# Patient Record
Sex: MALE
[De-identification: names, ages, dates, MRNs, and addresses within clinical notes are randomized per-mention and may not be internally consistent; named-entity substitution may affect disease eponyms.]

---

## 2018-01-26 ENCOUNTER — HOSPITAL ENCOUNTER (INPATIENT)
Dept: HOSPITAL 90 - EDH | Age: 68
LOS: 5 days | Discharge: HOME | DRG: 281 | End: 2018-01-31
Attending: FAMILY MEDICINE | Admitting: FAMILY MEDICINE
Payer: MEDICARE

## 2018-01-26 VITALS — SYSTOLIC BLOOD PRESSURE: 150 MMHG | DIASTOLIC BLOOD PRESSURE: 85 MMHG

## 2018-01-26 VITALS — DIASTOLIC BLOOD PRESSURE: 57 MMHG | SYSTOLIC BLOOD PRESSURE: 98 MMHG

## 2018-01-26 VITALS — DIASTOLIC BLOOD PRESSURE: 77 MMHG | SYSTOLIC BLOOD PRESSURE: 131 MMHG

## 2018-01-26 VITALS — BODY MASS INDEX: 35.36 KG/M2 | WEIGHT: 220 LBS | HEIGHT: 66 IN

## 2018-01-26 DIAGNOSIS — I11.0: ICD-10-CM

## 2018-01-26 DIAGNOSIS — Z95.1: ICD-10-CM

## 2018-01-26 DIAGNOSIS — Z28.21: ICD-10-CM

## 2018-01-26 DIAGNOSIS — E78.5: ICD-10-CM

## 2018-01-26 DIAGNOSIS — Z79.82: ICD-10-CM

## 2018-01-26 DIAGNOSIS — I47.1: ICD-10-CM

## 2018-01-26 DIAGNOSIS — I21.4: Primary | ICD-10-CM

## 2018-01-26 DIAGNOSIS — I25.110: ICD-10-CM

## 2018-01-26 DIAGNOSIS — Z82.49: ICD-10-CM

## 2018-01-26 DIAGNOSIS — Z87.891: ICD-10-CM

## 2018-01-26 DIAGNOSIS — Z88.0: ICD-10-CM

## 2018-01-26 DIAGNOSIS — I50.22: ICD-10-CM

## 2018-01-26 LAB
ALBUMIN SERPL-MCNC: 4.5 G/DL (ref 3.5–5)
ALT SERPL-CCNC: 60 U/L (ref 12–78)
AST SERPL-CCNC: 56 U/L (ref 10–37)
BASOPHILS NFR BLD AUTO: 0.3 % (ref 0–5)
BILIRUB SERPL-MCNC: 0.7 MG/DL (ref 0.2–1)
BUN SERPL-MCNC: 15 MG/DL (ref 7–18)
CHLORIDE SERPL-SCNC: 101 MMOL/L (ref 101–111)
CK MB SERPL-MCNC: 3 NG/ML (ref 0.5–3.6)
CK MB SERPL-MCNC: 41 NG/ML (ref 0.5–3.6)
CK SERPL-CCNC: 154 U/L (ref 21–232)
CK SERPL-CCNC: 285 U/L (ref 21–232)
CO2 SERPL-SCNC: 24 MMOL/L (ref 21–32)
CREAT SERPL-MCNC: 1.3 MG/DL (ref 0.5–1.5)
EOSINOPHIL NFR BLD AUTO: 0.2 % (ref 0–8)
ERYTHROCYTE [DISTWIDTH] IN BLOOD BY AUTOMATED COUNT: 13.7 % (ref 11–15.5)
GFR SERPL CREATININE-BSD FRML MDRD: 59 ML/MIN (ref 60–?)
GLUCOSE SERPL-MCNC: 155 MG/DL (ref 70–105)
HBA1C MFR BLD: 5.7 % (ref 4–6)
HCT VFR BLD AUTO: 48.8 % (ref 42–54)
LYMPHOCYTES NFR SPEC AUTO: 17.7 % (ref 21–51)
MCH RBC QN AUTO: 31.6 PG (ref 27–33)
MCHC RBC AUTO-ENTMCNC: 34.3 G/DL (ref 32–36)
MCV RBC AUTO: 92.1 FL (ref 79–99)
MONOCYTES NFR BLD AUTO: 2 % (ref 3–13)
MYOGLOBIN SERPL-MCNC: 85 NG/ML (ref 10–92)
MYOGLOBIN SERPL-MCNC: 89 NG/ML (ref 10–92)
NEUTROPHILS NFR BLD AUTO: 79.8 % (ref 40–77)
NRBC BLD MANUAL-RTO: 0 % (ref 0–0.19)
PLATELET # BLD AUTO: 219 K/UL (ref 130–400)
POTASSIUM SERPL-SCNC: 4 MMOL/L (ref 3.5–5.1)
PROT SERPL-MCNC: 8.3 G/DL (ref 6–8.3)
RBC # BLD AUTO: 5.3 MIL/UL (ref 4.5–6.2)
SODIUM SERPL-SCNC: 138 MMOL/L (ref 136–145)
T4 SERPL-MCNC: 7.1 MCG/DL (ref 4.7–13.3)
TROPONIN I SERPL-MCNC: 3.07 NG/ML (ref 0–0.06)
TROPONIN I SERPL-MCNC: < 0.04 NG/ML (ref 0–0.06)
TSH SERPL DL<=0.05 MIU/L-ACNC: 3.05 UIU/ML (ref 0.36–3.74)
WBC # BLD AUTO: 8.2 K/UL (ref 4.8–10.8)

## 2018-01-26 PROCEDURE — 82948 REAGENT STRIP/BLOOD GLUCOSE: CPT

## 2018-01-26 PROCEDURE — 82550 ASSAY OF CK (CPK): CPT

## 2018-01-26 PROCEDURE — 93459 L HRT ART/GRFT ANGIO: CPT

## 2018-01-26 PROCEDURE — 83880 ASSAY OF NATRIURETIC PEPTIDE: CPT

## 2018-01-26 PROCEDURE — 78452 HT MUSCLE IMAGE SPECT MULT: CPT

## 2018-01-26 PROCEDURE — 85610 PROTHROMBIN TIME: CPT

## 2018-01-26 PROCEDURE — 99291 CRITICAL CARE FIRST HOUR: CPT

## 2018-01-26 PROCEDURE — 80048 BASIC METABOLIC PNL TOTAL CA: CPT

## 2018-01-26 PROCEDURE — 85025 COMPLETE CBC W/AUTO DIFF WBC: CPT

## 2018-01-26 PROCEDURE — 82553 CREATINE MB FRACTION: CPT

## 2018-01-26 PROCEDURE — 84484 ASSAY OF TROPONIN QUANT: CPT

## 2018-01-26 PROCEDURE — 93005 ELECTROCARDIOGRAM TRACING: CPT

## 2018-01-26 PROCEDURE — 93017 CV STRESS TEST TRACING ONLY: CPT

## 2018-01-26 PROCEDURE — 83874 ASSAY OF MYOGLOBIN: CPT

## 2018-01-26 PROCEDURE — 99153 MOD SED SAME PHYS/QHP EA: CPT

## 2018-01-26 PROCEDURE — 80053 COMPREHEN METABOLIC PANEL: CPT

## 2018-01-26 PROCEDURE — 84443 ASSAY THYROID STIM HORMONE: CPT

## 2018-01-26 PROCEDURE — 99152 MOD SED SAME PHYS/QHP 5/>YRS: CPT

## 2018-01-26 PROCEDURE — 80061 LIPID PANEL: CPT

## 2018-01-26 PROCEDURE — 71045 X-RAY EXAM CHEST 1 VIEW: CPT

## 2018-01-26 PROCEDURE — 93306 TTE W/DOPPLER COMPLETE: CPT

## 2018-01-26 PROCEDURE — 84436 ASSAY OF TOTAL THYROXINE: CPT

## 2018-01-26 PROCEDURE — 96374 THER/PROPH/DIAG INJ IV PUSH: CPT

## 2018-01-26 PROCEDURE — 36415 COLL VENOUS BLD VENIPUNCTURE: CPT

## 2018-01-26 PROCEDURE — 83036 HEMOGLOBIN GLYCOSYLATED A1C: CPT

## 2018-01-26 PROCEDURE — 85027 COMPLETE CBC AUTOMATED: CPT

## 2018-01-26 RX ADMIN — ACETAMINOPHEN PRN MG: 325 TABLET, FILM COATED ORAL at 22:13

## 2018-01-26 RX ADMIN — FAMOTIDINE SCH MG: 10 INJECTION INTRAVENOUS at 20:46

## 2018-01-26 RX ADMIN — SODIUM CHLORIDE SCH UNIT: 9 INJECTION, SOLUTION INTRAVENOUS at 16:30

## 2018-01-26 RX ADMIN — Medication SCH MG: at 20:46

## 2018-01-26 RX ADMIN — SODIUM CHLORIDE SCH UNIT: 9 INJECTION, SOLUTION INTRAVENOUS at 20:47

## 2018-01-27 VITALS — DIASTOLIC BLOOD PRESSURE: 51 MMHG | SYSTOLIC BLOOD PRESSURE: 111 MMHG

## 2018-01-27 VITALS — SYSTOLIC BLOOD PRESSURE: 126 MMHG | DIASTOLIC BLOOD PRESSURE: 73 MMHG

## 2018-01-27 VITALS — DIASTOLIC BLOOD PRESSURE: 54 MMHG | SYSTOLIC BLOOD PRESSURE: 107 MMHG

## 2018-01-27 VITALS — SYSTOLIC BLOOD PRESSURE: 113 MMHG | DIASTOLIC BLOOD PRESSURE: 66 MMHG

## 2018-01-27 VITALS — DIASTOLIC BLOOD PRESSURE: 76 MMHG | SYSTOLIC BLOOD PRESSURE: 135 MMHG

## 2018-01-27 VITALS — SYSTOLIC BLOOD PRESSURE: 128 MMHG | DIASTOLIC BLOOD PRESSURE: 70 MMHG

## 2018-01-27 VITALS — DIASTOLIC BLOOD PRESSURE: 64 MMHG | SYSTOLIC BLOOD PRESSURE: 119 MMHG

## 2018-01-27 VITALS — SYSTOLIC BLOOD PRESSURE: 109 MMHG | DIASTOLIC BLOOD PRESSURE: 63 MMHG

## 2018-01-27 VITALS — DIASTOLIC BLOOD PRESSURE: 65 MMHG | SYSTOLIC BLOOD PRESSURE: 114 MMHG

## 2018-01-27 VITALS — DIASTOLIC BLOOD PRESSURE: 50 MMHG | SYSTOLIC BLOOD PRESSURE: 90 MMHG

## 2018-01-27 VITALS — DIASTOLIC BLOOD PRESSURE: 76 MMHG | SYSTOLIC BLOOD PRESSURE: 148 MMHG

## 2018-01-27 VITALS — DIASTOLIC BLOOD PRESSURE: 60 MMHG | SYSTOLIC BLOOD PRESSURE: 128 MMHG

## 2018-01-27 VITALS — SYSTOLIC BLOOD PRESSURE: 111 MMHG | DIASTOLIC BLOOD PRESSURE: 49 MMHG

## 2018-01-27 VITALS — DIASTOLIC BLOOD PRESSURE: 66 MMHG | SYSTOLIC BLOOD PRESSURE: 126 MMHG

## 2018-01-27 VITALS — SYSTOLIC BLOOD PRESSURE: 129 MMHG | DIASTOLIC BLOOD PRESSURE: 81 MMHG

## 2018-01-27 VITALS — DIASTOLIC BLOOD PRESSURE: 59 MMHG | SYSTOLIC BLOOD PRESSURE: 106 MMHG

## 2018-01-27 VITALS — SYSTOLIC BLOOD PRESSURE: 114 MMHG | DIASTOLIC BLOOD PRESSURE: 64 MMHG

## 2018-01-27 VITALS — SYSTOLIC BLOOD PRESSURE: 128 MMHG | DIASTOLIC BLOOD PRESSURE: 69 MMHG

## 2018-01-27 VITALS — SYSTOLIC BLOOD PRESSURE: 128 MMHG | DIASTOLIC BLOOD PRESSURE: 61 MMHG

## 2018-01-27 VITALS — DIASTOLIC BLOOD PRESSURE: 66 MMHG | SYSTOLIC BLOOD PRESSURE: 118 MMHG

## 2018-01-27 VITALS — DIASTOLIC BLOOD PRESSURE: 67 MMHG | SYSTOLIC BLOOD PRESSURE: 131 MMHG

## 2018-01-27 VITALS — SYSTOLIC BLOOD PRESSURE: 127 MMHG | DIASTOLIC BLOOD PRESSURE: 72 MMHG

## 2018-01-27 LAB
CK MB SERPL-MCNC: 21.8 NG/ML (ref 0.5–3.6)
CK MB SERPL-MCNC: 35.7 NG/ML (ref 0.5–3.6)
CK SERPL-CCNC: 213 U/L (ref 21–232)
CK SERPL-CCNC: 251 U/L (ref 21–232)
MYOGLOBIN SERPL-MCNC: 86 NG/ML (ref 10–92)
MYOGLOBIN SERPL-MCNC: 89 NG/ML (ref 10–92)
TROPONIN I SERPL-MCNC: 2.54 NG/ML (ref 0–0.06)
TROPONIN I SERPL-MCNC: 2.97 NG/ML (ref 0–0.06)

## 2018-01-27 RX ADMIN — ASPIRIN SCH MG: 325 TABLET, FILM COATED ORAL at 08:04

## 2018-01-27 RX ADMIN — FAMOTIDINE SCH MG: 10 INJECTION INTRAVENOUS at 08:04

## 2018-01-27 RX ADMIN — ATORVASTATIN CALCIUM SCH MG: 10 TABLET, FILM COATED ORAL at 20:38

## 2018-01-27 RX ADMIN — ACETAMINOPHEN PRN MG: 325 TABLET, FILM COATED ORAL at 19:55

## 2018-01-27 RX ADMIN — Medication SCH MG: at 20:38

## 2018-01-27 RX ADMIN — SODIUM CHLORIDE SCH UNIT: 9 INJECTION, SOLUTION INTRAVENOUS at 16:30

## 2018-01-27 RX ADMIN — SODIUM CHLORIDE SCH UNIT: 9 INJECTION, SOLUTION INTRAVENOUS at 06:26

## 2018-01-27 RX ADMIN — SODIUM CHLORIDE SCH UNIT: 9 INJECTION, SOLUTION INTRAVENOUS at 11:30

## 2018-01-27 RX ADMIN — CLOPIDOGREL BISULFATE SCH MG: 75 TABLET, FILM COATED ORAL at 16:15

## 2018-01-27 RX ADMIN — ACETAMINOPHEN PRN MG: 325 TABLET, FILM COATED ORAL at 23:20

## 2018-01-27 RX ADMIN — FAMOTIDINE SCH MG: 10 INJECTION INTRAVENOUS at 20:37

## 2018-01-27 RX ADMIN — Medication SCH MG: at 08:04

## 2018-01-27 RX ADMIN — SODIUM CHLORIDE SCH UNIT: 9 INJECTION, SOLUTION INTRAVENOUS at 21:00

## 2018-01-27 RX ADMIN — ACETAMINOPHEN PRN MG: 325 TABLET, FILM COATED ORAL at 14:35

## 2018-01-28 VITALS — DIASTOLIC BLOOD PRESSURE: 78 MMHG | SYSTOLIC BLOOD PRESSURE: 136 MMHG

## 2018-01-28 VITALS — SYSTOLIC BLOOD PRESSURE: 109 MMHG | DIASTOLIC BLOOD PRESSURE: 54 MMHG

## 2018-01-28 VITALS — SYSTOLIC BLOOD PRESSURE: 104 MMHG | DIASTOLIC BLOOD PRESSURE: 58 MMHG

## 2018-01-28 VITALS — DIASTOLIC BLOOD PRESSURE: 77 MMHG | SYSTOLIC BLOOD PRESSURE: 133 MMHG

## 2018-01-28 VITALS — SYSTOLIC BLOOD PRESSURE: 102 MMHG | DIASTOLIC BLOOD PRESSURE: 63 MMHG

## 2018-01-28 VITALS — SYSTOLIC BLOOD PRESSURE: 113 MMHG | DIASTOLIC BLOOD PRESSURE: 69 MMHG

## 2018-01-28 VITALS — DIASTOLIC BLOOD PRESSURE: 37 MMHG | SYSTOLIC BLOOD PRESSURE: 82 MMHG

## 2018-01-28 VITALS — SYSTOLIC BLOOD PRESSURE: 132 MMHG | DIASTOLIC BLOOD PRESSURE: 58 MMHG

## 2018-01-28 VITALS — DIASTOLIC BLOOD PRESSURE: 75 MMHG | SYSTOLIC BLOOD PRESSURE: 136 MMHG

## 2018-01-28 LAB
BUN SERPL-MCNC: 18 MG/DL (ref 7–18)
CHLORIDE SERPL-SCNC: 105 MMOL/L (ref 101–111)
CHOLEST SERPL-MCNC: 161 MG/DL (ref ?–200)
CK MB SERPL-MCNC: 7.8 NG/ML (ref 0.5–3.6)
CK SERPL-CCNC: 133 U/L (ref 21–232)
CO2 SERPL-SCNC: 27 MMOL/L (ref 21–32)
CREAT SERPL-MCNC: 1.2 MG/DL (ref 0.5–1.5)
ERYTHROCYTE [DISTWIDTH] IN BLOOD BY AUTOMATED COUNT: 13.6 % (ref 11–15.5)
GFR SERPL CREATININE-BSD FRML MDRD: 64 ML/MIN (ref 60–?)
GLUCOSE SERPL-MCNC: 99 MG/DL (ref 70–105)
HCT VFR BLD AUTO: 39.3 % (ref 42–54)
HDLC SERPL-MCNC: 44 MG/DL (ref 29–71)
LDLC SERPL CALC-MCNC: 103 MG/DL (ref 0–99)
MCH RBC QN AUTO: 31.6 PG (ref 27–33)
MCHC RBC AUTO-ENTMCNC: 34.3 G/DL (ref 32–36)
MCV RBC AUTO: 92.3 FL (ref 79–99)
MYOGLOBIN SERPL-MCNC: 92 NG/ML (ref 10–92)
NRBC BLD MANUAL-RTO: 0 % (ref 0–0.19)
PLATELET # BLD AUTO: 187 K/UL (ref 130–400)
POTASSIUM SERPL-SCNC: 3.8 MMOL/L (ref 3.5–5.1)
RBC # BLD AUTO: 4.25 MIL/UL (ref 4.5–6.2)
SODIUM SERPL-SCNC: 140 MMOL/L (ref 136–145)
TRIGL SERPL-MCNC: 141 MG/DL (ref 30–200)
TROPONIN I SERPL-MCNC: 1.85 NG/ML (ref 0–0.06)
WBC # BLD AUTO: 6.8 K/UL (ref 4.8–10.8)

## 2018-01-28 RX ADMIN — CHLORTHALIDONE SCH EACH: 50 TABLET ORAL at 09:14

## 2018-01-28 RX ADMIN — CHLORTHALIDONE SCH EACH: 50 TABLET ORAL at 09:00

## 2018-01-28 RX ADMIN — ENOXAPARIN SODIUM SCH MG: 100 INJECTION SUBCUTANEOUS at 08:17

## 2018-01-28 RX ADMIN — Medication SCH MG: at 21:37

## 2018-01-28 RX ADMIN — Medication SCH MG: at 21:38

## 2018-01-28 RX ADMIN — CLOPIDOGREL BISULFATE SCH MG: 75 TABLET, FILM COATED ORAL at 08:17

## 2018-01-28 RX ADMIN — FAMOTIDINE SCH MG: 10 INJECTION INTRAVENOUS at 21:39

## 2018-01-28 RX ADMIN — ATORVASTATIN CALCIUM SCH MG: 10 TABLET, FILM COATED ORAL at 21:37

## 2018-01-28 RX ADMIN — ASPIRIN SCH MG: 325 TABLET, FILM COATED ORAL at 08:17

## 2018-01-28 RX ADMIN — SODIUM CHLORIDE SCH UNIT: 9 INJECTION, SOLUTION INTRAVENOUS at 06:29

## 2018-01-28 RX ADMIN — FAMOTIDINE SCH MG: 10 INJECTION INTRAVENOUS at 08:35

## 2018-01-28 RX ADMIN — ACETAMINOPHEN PRN MG: 325 TABLET, FILM COATED ORAL at 20:17

## 2018-01-28 RX ADMIN — ACETAMINOPHEN PRN MG: 325 TABLET, FILM COATED ORAL at 06:42

## 2018-01-28 RX ADMIN — Medication SCH MG: at 08:17

## 2018-01-28 RX ADMIN — Medication SCH MG: at 13:19

## 2018-01-28 RX ADMIN — LOSARTAN POTASSIUM SCH MG: 50 TABLET, FILM COATED ORAL at 08:17

## 2018-01-29 VITALS — DIASTOLIC BLOOD PRESSURE: 68 MMHG | SYSTOLIC BLOOD PRESSURE: 128 MMHG

## 2018-01-29 VITALS — SYSTOLIC BLOOD PRESSURE: 118 MMHG | DIASTOLIC BLOOD PRESSURE: 61 MMHG

## 2018-01-29 VITALS — SYSTOLIC BLOOD PRESSURE: 141 MMHG | DIASTOLIC BLOOD PRESSURE: 79 MMHG

## 2018-01-29 VITALS — SYSTOLIC BLOOD PRESSURE: 141 MMHG | DIASTOLIC BLOOD PRESSURE: 81 MMHG

## 2018-01-29 VITALS — DIASTOLIC BLOOD PRESSURE: 61 MMHG | SYSTOLIC BLOOD PRESSURE: 161 MMHG

## 2018-01-29 VITALS — DIASTOLIC BLOOD PRESSURE: 77 MMHG | SYSTOLIC BLOOD PRESSURE: 135 MMHG

## 2018-01-29 VITALS — SYSTOLIC BLOOD PRESSURE: 104 MMHG | DIASTOLIC BLOOD PRESSURE: 59 MMHG

## 2018-01-29 LAB
BUN SERPL-MCNC: 18 MG/DL (ref 7–18)
CHLORIDE SERPL-SCNC: 105 MMOL/L (ref 101–111)
CO2 SERPL-SCNC: 30 MMOL/L (ref 21–32)
CREAT SERPL-MCNC: 1.2 MG/DL (ref 0.5–1.5)
GFR SERPL CREATININE-BSD FRML MDRD: 64 ML/MIN (ref 60–?)
GLUCOSE SERPL-MCNC: 95 MG/DL (ref 70–105)
INR PPP: 0.99 (ref 0.85–1.15)
POTASSIUM SERPL-SCNC: 3.7 MMOL/L (ref 3.5–5.1)
PROTHROMBIN TIME: 10.4 SEC (ref 9.6–11.6)
SODIUM SERPL-SCNC: 140 MMOL/L (ref 136–145)

## 2018-01-29 RX ADMIN — Medication SCH MG: at 16:52

## 2018-01-29 RX ADMIN — Medication SCH MG: at 21:19

## 2018-01-29 RX ADMIN — ACETAMINOPHEN PRN MG: 325 TABLET, FILM COATED ORAL at 18:56

## 2018-01-29 RX ADMIN — Medication SCH MG: at 16:53

## 2018-01-29 RX ADMIN — LOSARTAN POTASSIUM SCH MG: 50 TABLET, FILM COATED ORAL at 16:53

## 2018-01-29 RX ADMIN — ASPIRIN SCH MG: 325 TABLET, FILM COATED ORAL at 16:53

## 2018-01-29 RX ADMIN — CHLORTHALIDONE SCH EACH: 50 TABLET ORAL at 16:54

## 2018-01-29 RX ADMIN — FAMOTIDINE SCH MG: 10 INJECTION INTRAVENOUS at 21:20

## 2018-01-29 RX ADMIN — Medication SCH MG: at 21:20

## 2018-01-29 RX ADMIN — ENOXAPARIN SODIUM SCH MG: 100 INJECTION SUBCUTANEOUS at 16:55

## 2018-01-29 RX ADMIN — ATORVASTATIN CALCIUM SCH MG: 10 TABLET, FILM COATED ORAL at 21:19

## 2018-01-29 RX ADMIN — CLOPIDOGREL BISULFATE SCH MG: 75 TABLET, FILM COATED ORAL at 16:53

## 2018-01-29 RX ADMIN — FAMOTIDINE SCH MG: 10 INJECTION INTRAVENOUS at 16:55

## 2018-01-30 VITALS — DIASTOLIC BLOOD PRESSURE: 53 MMHG | SYSTOLIC BLOOD PRESSURE: 118 MMHG

## 2018-01-30 VITALS — SYSTOLIC BLOOD PRESSURE: 104 MMHG | DIASTOLIC BLOOD PRESSURE: 56 MMHG

## 2018-01-30 VITALS — SYSTOLIC BLOOD PRESSURE: 149 MMHG | DIASTOLIC BLOOD PRESSURE: 75 MMHG

## 2018-01-30 VITALS — DIASTOLIC BLOOD PRESSURE: 74 MMHG | SYSTOLIC BLOOD PRESSURE: 126 MMHG

## 2018-01-30 VITALS — DIASTOLIC BLOOD PRESSURE: 70 MMHG | SYSTOLIC BLOOD PRESSURE: 128 MMHG

## 2018-01-30 VITALS — DIASTOLIC BLOOD PRESSURE: 79 MMHG | SYSTOLIC BLOOD PRESSURE: 130 MMHG

## 2018-01-30 VITALS — DIASTOLIC BLOOD PRESSURE: 83 MMHG | SYSTOLIC BLOOD PRESSURE: 148 MMHG

## 2018-01-30 VITALS — DIASTOLIC BLOOD PRESSURE: 57 MMHG | SYSTOLIC BLOOD PRESSURE: 88 MMHG

## 2018-01-30 VITALS — SYSTOLIC BLOOD PRESSURE: 142 MMHG | DIASTOLIC BLOOD PRESSURE: 80 MMHG

## 2018-01-30 VITALS — DIASTOLIC BLOOD PRESSURE: 90 MMHG | SYSTOLIC BLOOD PRESSURE: 137 MMHG

## 2018-01-30 VITALS — DIASTOLIC BLOOD PRESSURE: 75 MMHG | SYSTOLIC BLOOD PRESSURE: 134 MMHG

## 2018-01-30 VITALS — SYSTOLIC BLOOD PRESSURE: 127 MMHG | DIASTOLIC BLOOD PRESSURE: 70 MMHG

## 2018-01-30 VITALS — SYSTOLIC BLOOD PRESSURE: 134 MMHG | DIASTOLIC BLOOD PRESSURE: 63 MMHG

## 2018-01-30 VITALS — DIASTOLIC BLOOD PRESSURE: 56 MMHG | SYSTOLIC BLOOD PRESSURE: 91 MMHG

## 2018-01-30 LAB
BUN SERPL-MCNC: 22 MG/DL (ref 7–18)
CHLORIDE SERPL-SCNC: 106 MMOL/L (ref 101–111)
CO2 SERPL-SCNC: 29 MMOL/L (ref 21–32)
CREAT SERPL-MCNC: 1.2 MG/DL (ref 0.5–1.5)
ERYTHROCYTE [DISTWIDTH] IN BLOOD BY AUTOMATED COUNT: 13.4 % (ref 11–15.5)
GFR SERPL CREATININE-BSD FRML MDRD: 64 ML/MIN (ref 60–?)
GLUCOSE SERPL-MCNC: 102 MG/DL (ref 70–105)
HCT VFR BLD AUTO: 41.8 % (ref 42–54)
MCH RBC QN AUTO: 31.7 PG (ref 27–33)
MCHC RBC AUTO-ENTMCNC: 34.5 G/DL (ref 32–36)
MCV RBC AUTO: 92 FL (ref 79–99)
NRBC BLD MANUAL-RTO: 0 % (ref 0–0.19)
PLATELET # BLD AUTO: 200 K/UL (ref 130–400)
POTASSIUM SERPL-SCNC: 4 MMOL/L (ref 3.5–5.1)
RBC # BLD AUTO: 4.54 MIL/UL (ref 4.5–6.2)
SODIUM SERPL-SCNC: 140 MMOL/L (ref 136–145)
WBC # BLD AUTO: 5.7 K/UL (ref 4.8–10.8)

## 2018-01-30 PROCEDURE — B2111ZZ FLUOROSCOPY OF MULTIPLE CORONARY ARTERIES USING LOW OSMOLAR CONTRAST: ICD-10-PCS | Performed by: INTERNAL MEDICINE

## 2018-01-30 PROCEDURE — 4A023N7 MEASUREMENT OF CARDIAC SAMPLING AND PRESSURE, LEFT HEART, PERCUTANEOUS APPROACH: ICD-10-PCS | Performed by: INTERNAL MEDICINE

## 2018-01-30 PROCEDURE — B2131ZZ FLUOROSCOPY OF MULTIPLE CORONARY ARTERY BYPASS GRAFTS USING LOW OSMOLAR CONTRAST: ICD-10-PCS | Performed by: INTERNAL MEDICINE

## 2018-01-30 PROCEDURE — B2181ZZ FLUOROSCOPY OF LEFT INTERNAL MAMMARY BYPASS GRAFT USING LOW OSMOLAR CONTRAST: ICD-10-PCS | Performed by: INTERNAL MEDICINE

## 2018-01-30 PROCEDURE — B3101ZZ FLUOROSCOPY OF THORACIC AORTA USING LOW OSMOLAR CONTRAST: ICD-10-PCS | Performed by: INTERNAL MEDICINE

## 2018-01-30 PROCEDURE — B2151ZZ FLUOROSCOPY OF LEFT HEART USING LOW OSMOLAR CONTRAST: ICD-10-PCS | Performed by: INTERNAL MEDICINE

## 2018-01-30 RX ADMIN — CHLORTHALIDONE SCH EACH: 50 TABLET ORAL at 07:54

## 2018-01-30 RX ADMIN — Medication SCH MG: at 21:09

## 2018-01-30 RX ADMIN — Medication SCH MG: at 07:44

## 2018-01-30 RX ADMIN — FAMOTIDINE SCH MG: 10 INJECTION INTRAVENOUS at 07:45

## 2018-01-30 RX ADMIN — FAMOTIDINE SCH MG: 10 INJECTION INTRAVENOUS at 21:09

## 2018-01-30 RX ADMIN — ACETAMINOPHEN AND CODEINE PHOSPHATE PRN TAB: 300; 30 TABLET ORAL at 21:11

## 2018-01-30 RX ADMIN — ACETAMINOPHEN PRN MG: 325 TABLET, FILM COATED ORAL at 00:38

## 2018-01-30 RX ADMIN — CLOPIDOGREL BISULFATE SCH MG: 75 TABLET, FILM COATED ORAL at 17:07

## 2018-01-30 RX ADMIN — ASPIRIN SCH MG: 325 TABLET, FILM COATED ORAL at 07:45

## 2018-01-30 RX ADMIN — ACETAMINOPHEN AND CODEINE PHOSPHATE PRN TAB: 300; 30 TABLET ORAL at 17:06

## 2018-01-30 RX ADMIN — LOSARTAN POTASSIUM SCH MG: 50 TABLET, FILM COATED ORAL at 07:44

## 2018-01-30 RX ADMIN — Medication SCH MG: at 07:45

## 2018-01-30 RX ADMIN — ATORVASTATIN CALCIUM SCH MG: 10 TABLET, FILM COATED ORAL at 21:09

## 2018-01-31 VITALS — DIASTOLIC BLOOD PRESSURE: 57 MMHG | SYSTOLIC BLOOD PRESSURE: 113 MMHG

## 2018-01-31 VITALS — DIASTOLIC BLOOD PRESSURE: 75 MMHG | SYSTOLIC BLOOD PRESSURE: 120 MMHG

## 2018-01-31 VITALS — DIASTOLIC BLOOD PRESSURE: 69 MMHG | SYSTOLIC BLOOD PRESSURE: 121 MMHG

## 2018-01-31 LAB
BUN SERPL-MCNC: 20 MG/DL (ref 7–18)
CHLORIDE SERPL-SCNC: 107 MMOL/L (ref 101–111)
CO2 SERPL-SCNC: 28 MMOL/L (ref 21–32)
CREAT SERPL-MCNC: 1.1 MG/DL (ref 0.5–1.5)
ERYTHROCYTE [DISTWIDTH] IN BLOOD BY AUTOMATED COUNT: 13.5 % (ref 11–15.5)
GFR SERPL CREATININE-BSD FRML MDRD: 71 ML/MIN (ref 60–?)
GLUCOSE SERPL-MCNC: 100 MG/DL (ref 70–105)
HCT VFR BLD AUTO: 40.6 % (ref 42–54)
MCH RBC QN AUTO: 31.8 PG (ref 27–33)
MCHC RBC AUTO-ENTMCNC: 34.5 G/DL (ref 32–36)
MCV RBC AUTO: 92.3 FL (ref 79–99)
NRBC BLD MANUAL-RTO: 0.1 % (ref 0–0.19)
PLATELET # BLD AUTO: 198 K/UL (ref 130–400)
POTASSIUM SERPL-SCNC: 3.6 MMOL/L (ref 3.5–5.1)
RBC # BLD AUTO: 4.4 MIL/UL (ref 4.5–6.2)
SODIUM SERPL-SCNC: 141 MMOL/L (ref 136–145)
WBC # BLD AUTO: 4.8 K/UL (ref 4.8–10.8)

## 2018-01-31 RX ADMIN — ASPIRIN SCH MG: 325 TABLET, FILM COATED ORAL at 09:53

## 2018-01-31 RX ADMIN — CHLORTHALIDONE SCH EACH: 50 TABLET ORAL at 09:00

## 2018-01-31 RX ADMIN — ACETAMINOPHEN PRN MG: 325 TABLET, FILM COATED ORAL at 06:34

## 2018-01-31 RX ADMIN — LOSARTAN POTASSIUM SCH MG: 50 TABLET, FILM COATED ORAL at 09:54

## 2018-01-31 RX ADMIN — CLOPIDOGREL BISULFATE SCH MG: 75 TABLET, FILM COATED ORAL at 09:54

## 2018-01-31 RX ADMIN — Medication SCH MG: at 09:54

## 2018-01-31 RX ADMIN — FAMOTIDINE SCH MG: 10 INJECTION INTRAVENOUS at 09:53

## 2018-01-31 RX ADMIN — Medication SCH MG: at 09:55

## 2018-06-28 ENCOUNTER — HOSPITAL ENCOUNTER (EMERGENCY)
Dept: HOSPITAL 90 - EDH | Age: 68
Discharge: HOME | End: 2018-06-28
Payer: MEDICARE

## 2018-06-28 DIAGNOSIS — I25.810: ICD-10-CM

## 2018-06-28 DIAGNOSIS — Z88.8: ICD-10-CM

## 2018-06-28 DIAGNOSIS — Z88.0: ICD-10-CM

## 2018-06-28 DIAGNOSIS — M19.072: Primary | ICD-10-CM

## 2018-06-28 DIAGNOSIS — E78.5: ICD-10-CM

## 2018-06-28 DIAGNOSIS — I10: ICD-10-CM

## 2018-06-28 PROCEDURE — 96372 THER/PROPH/DIAG INJ SC/IM: CPT

## 2018-06-28 PROCEDURE — 99284 EMERGENCY DEPT VISIT MOD MDM: CPT

## 2018-06-28 PROCEDURE — 73600 X-RAY EXAM OF ANKLE: CPT

## 2018-12-28 ENCOUNTER — HOSPITAL ENCOUNTER (EMERGENCY)
Dept: HOSPITAL 90 - EDH | Age: 68
Discharge: HOME | End: 2018-12-28
Payer: MEDICARE

## 2018-12-28 DIAGNOSIS — Z87.891: ICD-10-CM

## 2018-12-28 DIAGNOSIS — Z88.0: ICD-10-CM

## 2018-12-28 DIAGNOSIS — I10: ICD-10-CM

## 2018-12-28 DIAGNOSIS — I25.810: ICD-10-CM

## 2018-12-28 DIAGNOSIS — Z88.8: ICD-10-CM

## 2018-12-28 DIAGNOSIS — E78.5: ICD-10-CM

## 2018-12-28 DIAGNOSIS — J10.1: Primary | ICD-10-CM

## 2018-12-28 DIAGNOSIS — Z98.890: ICD-10-CM

## 2018-12-28 PROCEDURE — 99283 EMERGENCY DEPT VISIT LOW MDM: CPT

## 2018-12-28 PROCEDURE — 94640 AIRWAY INHALATION TREATMENT: CPT

## 2018-12-28 PROCEDURE — 96372 THER/PROPH/DIAG INJ SC/IM: CPT

## 2018-12-28 PROCEDURE — 71046 X-RAY EXAM CHEST 2 VIEWS: CPT

## 2020-07-17 ENCOUNTER — HOSPITAL ENCOUNTER (INPATIENT)
Dept: HOSPITAL 90 - EDH | Age: 70
LOS: 18 days | DRG: 871 | End: 2020-08-04
Attending: INTERNAL MEDICINE | Admitting: INTERNAL MEDICINE
Payer: MEDICARE

## 2020-07-17 VITALS — HEIGHT: 66 IN | BODY MASS INDEX: 30.53 KG/M2 | WEIGHT: 190 LBS

## 2020-07-17 DIAGNOSIS — Z88.8: ICD-10-CM

## 2020-07-17 DIAGNOSIS — A41.89: Primary | ICD-10-CM

## 2020-07-17 DIAGNOSIS — U07.1: ICD-10-CM

## 2020-07-17 DIAGNOSIS — K72.00: ICD-10-CM

## 2020-07-17 DIAGNOSIS — I11.0: ICD-10-CM

## 2020-07-17 DIAGNOSIS — J12.89: ICD-10-CM

## 2020-07-17 DIAGNOSIS — I25.10: ICD-10-CM

## 2020-07-17 DIAGNOSIS — Z82.0: ICD-10-CM

## 2020-07-17 DIAGNOSIS — A41.50: ICD-10-CM

## 2020-07-17 DIAGNOSIS — Z95.1: ICD-10-CM

## 2020-07-17 DIAGNOSIS — Z66: ICD-10-CM

## 2020-07-17 DIAGNOSIS — Z82.5: ICD-10-CM

## 2020-07-17 DIAGNOSIS — Z83.3: ICD-10-CM

## 2020-07-17 DIAGNOSIS — E87.6: ICD-10-CM

## 2020-07-17 DIAGNOSIS — Z82.3: ICD-10-CM

## 2020-07-17 DIAGNOSIS — J96.01: ICD-10-CM

## 2020-07-17 DIAGNOSIS — Z82.49: ICD-10-CM

## 2020-07-17 DIAGNOSIS — E78.5: ICD-10-CM

## 2020-07-17 DIAGNOSIS — Z80.42: ICD-10-CM

## 2020-07-17 DIAGNOSIS — N17.9: ICD-10-CM

## 2020-07-17 DIAGNOSIS — D50.0: ICD-10-CM

## 2020-07-17 DIAGNOSIS — I50.43: ICD-10-CM

## 2020-07-17 DIAGNOSIS — R65.21: ICD-10-CM

## 2020-07-17 DIAGNOSIS — R73.03: ICD-10-CM

## 2020-07-17 DIAGNOSIS — I25.2: ICD-10-CM

## 2020-07-17 DIAGNOSIS — K92.0: ICD-10-CM

## 2020-07-17 DIAGNOSIS — Z88.0: ICD-10-CM

## 2020-07-17 DIAGNOSIS — B96.20: ICD-10-CM

## 2020-07-17 LAB
ALBUMIN SERPL-MCNC: 3.5 G/DL (ref 3.5–5)
ALT SERPL-CCNC: 22 U/L (ref 12–78)
APTT PPP: 28 SEC (ref 26.3–35.5)
AST SERPL-CCNC: 32 U/L (ref 10–37)
BASE EXCESS BLDA CALC-SCNC: -0.7 MMOL/L (ref -2–3)
BASOPHILS NFR BLD AUTO: 1.3 % (ref 0–5)
BILIRUB SERPL-MCNC: 0.5 MG/DL (ref 0.2–1)
BUN SERPL-MCNC: 16 MG/DL (ref 7–18)
CHLORIDE SERPL-SCNC: 99 MMOL/L (ref 101–111)
CK SERPL-CCNC: 205 U/L (ref 21–232)
CO2 SERPL-SCNC: 24 MMOL/L (ref 21–32)
CREAT SERPL-MCNC: 1.1 MG/DL (ref 0.5–1.5)
DEPRECATED SQUAMOUS URNS QL MICRO: (no result) /HPF (ref 0–2)
EOSINOPHIL NFR BLD AUTO: 0.6 % (ref 0–8)
ERYTHROCYTE [DISTWIDTH] IN BLOOD BY AUTOMATED COUNT: 13.3 % (ref 11–15.5)
GFR SERPL CREATININE-BSD FRML MDRD: 71 ML/MIN (ref 60–?)
GLUCOSE SERPL-MCNC: 110 MG/DL (ref 70–105)
HBA1C MFR BLD: 6.5 % (ref 4–6)
HCO3 BLDA-SCNC: 22.8 MMOL/L (ref 21–28)
HCT VFR BLD AUTO: 42.6 % (ref 42–54)
INR PPP: 0.89 (ref 0.85–1.15)
LYMPHOCYTES NFR SPEC AUTO: 13.8 % (ref 21–51)
MCH RBC QN AUTO: 30.8 PG (ref 27–33)
MCHC RBC AUTO-ENTMCNC: 33.8 G/DL (ref 32–36)
MCV RBC AUTO: 91.2 FL (ref 79–99)
MONOCYTES NFR BLD AUTO: 8.6 % (ref 3–13)
MYOGLOBIN SERPL-MCNC: 158 NG/ML (ref 10–92)
NEUTROPHILS NFR BLD AUTO: 75.4 % (ref 40–77)
NRBC BLD MANUAL-RTO: 0 % (ref 0–0.19)
PCO2 BLDA: 35 MMHG (ref 35–48)
PH UR STRIP: 5 [PH] (ref 5–8)
PLATELET # BLD AUTO: 155 K/UL (ref 130–400)
POTASSIUM SERPL-SCNC: 3.7 MMOL/L (ref 3.5–5.1)
PROT SERPL-MCNC: 7.8 G/DL (ref 6–8.3)
PROT UR QL STRIP: (no result) MG/DL
PROTHROMBIN TIME: 9.7 SEC (ref 9.6–11.6)
RBC # BLD AUTO: 4.67 MIL/UL (ref 4.5–6.2)
RBC #/AREA URNS HPF: (no result) /HPF (ref 0–1)
SAO2 % BLDA: 96.7 % (ref 95–99)
SODIUM SERPL-SCNC: 136 MMOL/L (ref 136–145)
SP GR UR STRIP: 1.02 (ref 1–1.03)
TROPONIN I SERPL-MCNC: < 0.04 NG/ML (ref 0–0.06)
UROBILINOGEN UR STRIP-MCNC: 0.2 MG/DL (ref 0.2–1)
WBC # BLD AUTO: 6.4 K/UL (ref 4.8–10.8)
WBC #/AREA URNS HPF: (no result) /HPF (ref 0–1)

## 2020-07-17 PROCEDURE — 86901 BLOOD TYPING SEROLOGIC RH(D): CPT

## 2020-07-17 PROCEDURE — 83880 ASSAY OF NATRIURETIC PEPTIDE: CPT

## 2020-07-17 PROCEDURE — 36430 TRANSFUSION BLD/BLD COMPNT: CPT

## 2020-07-17 PROCEDURE — 84484 ASSAY OF TROPONIN QUANT: CPT

## 2020-07-17 PROCEDURE — 82803 BLOOD GASES ANY COMBINATION: CPT

## 2020-07-17 PROCEDURE — 36600 WITHDRAWAL OF ARTERIAL BLOOD: CPT

## 2020-07-17 PROCEDURE — 87804 INFLUENZA ASSAY W/OPTIC: CPT

## 2020-07-17 PROCEDURE — 86927 PLASMA FRESH FROZEN: CPT

## 2020-07-17 PROCEDURE — 82948 REAGENT STRIP/BLOOD GLUCOSE: CPT

## 2020-07-17 PROCEDURE — 83605 ASSAY OF LACTIC ACID: CPT

## 2020-07-17 PROCEDURE — 80048 BASIC METABOLIC PNL TOTAL CA: CPT

## 2020-07-17 PROCEDURE — 94003 VENT MGMT INPAT SUBQ DAY: CPT

## 2020-07-17 PROCEDURE — 93005 ELECTROCARDIOGRAM TRACING: CPT

## 2020-07-17 PROCEDURE — 87088 URINE BACTERIA CULTURE: CPT

## 2020-07-17 PROCEDURE — 30233N1 TRANSFUSION OF NONAUTOLOGOUS RED BLOOD CELLS INTO PERIPHERAL VEIN, PERCUTANEOUS APPROACH: ICD-10-PCS | Performed by: INTERNAL MEDICINE

## 2020-07-17 PROCEDURE — 86900 BLOOD TYPING SEROLOGIC ABO: CPT

## 2020-07-17 PROCEDURE — 85025 COMPLETE CBC W/AUTO DIFF WBC: CPT

## 2020-07-17 PROCEDURE — 81001 URINALYSIS AUTO W/SCOPE: CPT

## 2020-07-17 PROCEDURE — 83036 HEMOGLOBIN GLYCOSYLATED A1C: CPT

## 2020-07-17 PROCEDURE — 82435 ASSAY OF BLOOD CHLORIDE: CPT

## 2020-07-17 PROCEDURE — 87186 SC STD MICRODIL/AGAR DIL: CPT

## 2020-07-17 PROCEDURE — 31500 INSERT EMERGENCY AIRWAY: CPT

## 2020-07-17 PROCEDURE — 80053 COMPREHEN METABOLIC PANEL: CPT

## 2020-07-17 PROCEDURE — 82728 ASSAY OF FERRITIN: CPT

## 2020-07-17 PROCEDURE — 86922 COMPATIBILITY TEST ANTIGLOB: CPT

## 2020-07-17 PROCEDURE — 36415 COLL VENOUS BLD VENIPUNCTURE: CPT

## 2020-07-17 PROCEDURE — 71045 X-RAY EXAM CHEST 1 VIEW: CPT

## 2020-07-17 PROCEDURE — 84145 PROCALCITONIN (PCT): CPT

## 2020-07-17 PROCEDURE — 84295 ASSAY OF SERUM SODIUM: CPT

## 2020-07-17 PROCEDURE — 85610 PROTHROMBIN TIME: CPT

## 2020-07-17 PROCEDURE — 85378 FIBRIN DEGRADE SEMIQUANT: CPT

## 2020-07-17 PROCEDURE — 85018 HEMOGLOBIN: CPT

## 2020-07-17 PROCEDURE — 83735 ASSAY OF MAGNESIUM: CPT

## 2020-07-17 PROCEDURE — 82947 ASSAY GLUCOSE BLOOD QUANT: CPT

## 2020-07-17 PROCEDURE — 85730 THROMBOPLASTIN TIME PARTIAL: CPT

## 2020-07-17 PROCEDURE — 83874 ASSAY OF MYOGLOBIN: CPT

## 2020-07-17 PROCEDURE — 92950 HEART/LUNG RESUSCITATION CPR: CPT

## 2020-07-17 PROCEDURE — 86850 RBC ANTIBODY SCREEN: CPT

## 2020-07-17 PROCEDURE — 76770 US EXAM ABDO BACK WALL COMP: CPT

## 2020-07-17 PROCEDURE — 84132 ASSAY OF SERUM POTASSIUM: CPT

## 2020-07-17 PROCEDURE — 83615 LACTATE (LD) (LDH) ENZYME: CPT

## 2020-07-17 PROCEDURE — 87077 CULTURE AEROBIC IDENTIFY: CPT

## 2020-07-17 PROCEDURE — 84100 ASSAY OF PHOSPHORUS: CPT

## 2020-07-17 PROCEDURE — XW033E5 INTRODUCTION OF REMDESIVIR ANTI-INFECTIVE INTO PERIPHERAL VEIN, PERCUTANEOUS APPROACH, NEW TECHNOLOGY GROUP 5: ICD-10-PCS | Performed by: INTERNAL MEDICINE

## 2020-07-17 PROCEDURE — XW13325 TRANSFUSION OF CONVALESCENT PLASMA (NONAUTOLOGOUS) INTO PERIPHERAL VEIN, PERCUTANEOUS APPROACH, NEW TECHNOLOGY GROUP 5: ICD-10-PCS | Performed by: INTERNAL MEDICINE

## 2020-07-17 PROCEDURE — 82550 ASSAY OF CK (CPK): CPT

## 2020-07-17 PROCEDURE — 94660 CPAP INITIATION&MGMT: CPT

## 2020-07-17 PROCEDURE — 94002 VENT MGMT INPAT INIT DAY: CPT

## 2020-07-17 PROCEDURE — 87040 BLOOD CULTURE FOR BACTERIA: CPT

## 2020-07-17 PROCEDURE — 86140 C-REACTIVE PROTEIN: CPT

## 2020-07-17 PROCEDURE — 71275 CT ANGIOGRAPHY CHEST: CPT

## 2020-07-17 RX ADMIN — Medication SCH MG: at 17:15

## 2020-07-17 RX ADMIN — METHYLPREDNISOLONE SODIUM SUCCINATE SCH MG: 40 INJECTION, POWDER, FOR SOLUTION INTRAMUSCULAR; INTRAVENOUS at 08:30

## 2020-07-17 RX ADMIN — AZITHROMYCIN MONOHYDRATE SCH MLS/HR: 500 INJECTION, POWDER, LYOPHILIZED, FOR SOLUTION INTRAVENOUS at 17:00

## 2020-07-18 VITALS — SYSTOLIC BLOOD PRESSURE: 146 MMHG | DIASTOLIC BLOOD PRESSURE: 75 MMHG

## 2020-07-18 VITALS — SYSTOLIC BLOOD PRESSURE: 129 MMHG | DIASTOLIC BLOOD PRESSURE: 70 MMHG

## 2020-07-18 VITALS — SYSTOLIC BLOOD PRESSURE: 155 MMHG | DIASTOLIC BLOOD PRESSURE: 75 MMHG

## 2020-07-18 LAB
ALBUMIN SERPL-MCNC: 3 G/DL (ref 3.5–5)
ALT SERPL-CCNC: 20 U/L (ref 12–78)
AST SERPL-CCNC: 45 U/L (ref 10–37)
BASOPHILS NFR BLD AUTO: 0.5 % (ref 0–5)
BILIRUB SERPL-MCNC: 0.6 MG/DL (ref 0.2–1)
BUN SERPL-MCNC: 18 MG/DL (ref 7–18)
CHLORIDE SERPL-SCNC: 103 MMOL/L (ref 101–111)
CO2 SERPL-SCNC: 24 MMOL/L (ref 21–32)
CREAT SERPL-MCNC: 0.8 MG/DL (ref 0.5–1.5)
CRP SERPL HS-MCNC: 97.1 MG/L (ref 0–9)
EOSINOPHIL NFR BLD AUTO: 0 % (ref 0–8)
ERYTHROCYTE [DISTWIDTH] IN BLOOD BY AUTOMATED COUNT: 13.5 % (ref 11–15.5)
GFR SERPL CREATININE-BSD FRML MDRD: 102 ML/MIN (ref 60–?)
GLUCOSE SERPL-MCNC: 176 MG/DL (ref 70–105)
HCT VFR BLD AUTO: 43.5 % (ref 42–54)
LYMPHOCYTES NFR SPEC AUTO: 6 % (ref 21–51)
MCH RBC QN AUTO: 31 PG (ref 27–33)
MCHC RBC AUTO-ENTMCNC: 34 G/DL (ref 32–36)
MCV RBC AUTO: 91.2 FL (ref 79–99)
MONOCYTES NFR BLD AUTO: 4.2 % (ref 3–13)
NEUTROPHILS NFR BLD AUTO: 89 % (ref 40–77)
NRBC BLD MANUAL-RTO: 0 % (ref 0–0.19)
PLATELET # BLD AUTO: 178 K/UL (ref 130–400)
POTASSIUM SERPL-SCNC: 4.3 MMOL/L (ref 3.5–5.1)
PROT SERPL-MCNC: 7.4 G/DL (ref 6–8.3)
RBC # BLD AUTO: 4.77 MIL/UL (ref 4.5–6.2)
SODIUM SERPL-SCNC: 135 MMOL/L (ref 136–145)
WBC # BLD AUTO: 6.4 K/UL (ref 4.8–10.8)

## 2020-07-18 RX ADMIN — ASPIRIN TAB DELAYED RELEASE 81 MG SCH MG: 81 TABLET DELAYED RESPONSE at 09:00

## 2020-07-18 RX ADMIN — ALBUTEROL SULFATE SCH INH: 90 AEROSOL, METERED RESPIRATORY (INHALATION) at 18:00

## 2020-07-18 RX ADMIN — OXYCODONE HYDROCHLORIDE AND ACETAMINOPHEN SCH MG: 500 TABLET ORAL at 09:00

## 2020-07-18 RX ADMIN — LOSARTAN POTASSIUM SCH MG: 50 TABLET, FILM COATED ORAL at 09:00

## 2020-07-18 RX ADMIN — Medication SCH MG: at 17:30

## 2020-07-18 RX ADMIN — BENZONATATE SCH MG: 100 CAPSULE ORAL at 13:30

## 2020-07-18 RX ADMIN — SUCRALFATE SCH GM: 1 TABLET ORAL at 16:30

## 2020-07-18 RX ADMIN — ALBUTEROL SULFATE SCH INH: 90 AEROSOL, METERED RESPIRATORY (INHALATION) at 12:00

## 2020-07-18 RX ADMIN — METHYLPREDNISOLONE SODIUM SUCCINATE SCH MG: 40 INJECTION, POWDER, FOR SOLUTION INTRAMUSCULAR; INTRAVENOUS at 13:30

## 2020-07-18 RX ADMIN — SUCRALFATE SCH GM: 1 TABLET ORAL at 11:30

## 2020-07-18 RX ADMIN — GUAIFENESIN AND DEXTROMETHORPHAN PRN ML: 100; 10 SYRUP ORAL at 17:30

## 2020-07-18 RX ADMIN — SUCRALFATE SCH GM: 1 TABLET ORAL at 08:30

## 2020-07-18 RX ADMIN — BENZONATATE SCH MG: 100 CAPSULE ORAL at 08:30

## 2020-07-18 RX ADMIN — METHYLPREDNISOLONE SODIUM SUCCINATE SCH MG: 40 INJECTION, POWDER, FOR SOLUTION INTRAMUSCULAR; INTRAVENOUS at 08:30

## 2020-07-18 RX ADMIN — AZITHROMYCIN MONOHYDRATE SCH MLS/HR: 500 INJECTION, POWDER, LYOPHILIZED, FOR SOLUTION INTRAVENOUS at 17:00

## 2020-07-18 NOTE — NUR
SW NOTE

SW attempted to contact patient and daughter to complete IA but was unsuccessful. SW will 
attempt at a later time.

## 2020-07-18 NOTE — NUR
ASSUMED CARE OF PATIENT

RECEIVED REPORT FROM WILBER MCGOWAN RN. PT STABLE, AAO X 3, FOLLOWS COMMANDS, NOTED TO GET SOB 
ON EXERTION WHILE TALKING AND MOVING AROUND IN THE BED. BBS DIMINISHED TO ALL LOBES. O2 @ 
15L VIA NON-REBREATHER MASK, O2 SATS 95 %, WITH TACHYPNEA NOTED, INCREASED O2 TO 15 LITERS, 
PT C/O GBW, NUMBNESS AT TIMES TO THE RIGHT LOWER EXTREMITY, MD AWARE. DENIES ANY PAIN. CALL 
BELL PLACED IN REACH. SIDE RAILS UP X 2. BED IN LOWEST POSITION.

## 2020-07-19 LAB
ALBUMIN SERPL-MCNC: 3.2 G/DL (ref 3.5–5)
ALT SERPL-CCNC: 22 U/L (ref 12–78)
AST SERPL-CCNC: 29 U/L (ref 10–37)
BASOPHILS NFR BLD AUTO: 0.5 % (ref 0–5)
BILIRUB SERPL-MCNC: 0.5 MG/DL (ref 0.2–1)
BUN SERPL-MCNC: 21 MG/DL (ref 7–18)
CHLORIDE SERPL-SCNC: 100 MMOL/L (ref 101–111)
CO2 SERPL-SCNC: 27 MMOL/L (ref 21–32)
CREAT SERPL-MCNC: 1.2 MG/DL (ref 0.5–1.5)
CRP SERPL HS-MCNC: 63.3 MG/L (ref 0–9)
EOSINOPHIL NFR BLD AUTO: 0.5 % (ref 0–8)
ERYTHROCYTE [DISTWIDTH] IN BLOOD BY AUTOMATED COUNT: 13.3 % (ref 11–15.5)
GFR SERPL CREATININE-BSD FRML MDRD: 64 ML/MIN (ref 60–?)
GLUCOSE SERPL-MCNC: 201 MG/DL (ref 70–105)
HCT VFR BLD AUTO: 43.8 % (ref 42–54)
LYMPHOCYTES NFR SPEC AUTO: 5.2 % (ref 21–51)
MAGNESIUM SERPL-MCNC: 2.2 MG/DL (ref 1.8–2.4)
MCH RBC QN AUTO: 31.1 PG (ref 27–33)
MCHC RBC AUTO-ENTMCNC: 34.2 G/DL (ref 32–36)
MCV RBC AUTO: 90.7 FL (ref 79–99)
MONOCYTES NFR BLD AUTO: 3.7 % (ref 3–13)
NEUTROPHILS NFR BLD AUTO: 89.8 % (ref 40–77)
NRBC BLD MANUAL-RTO: 0 % (ref 0–0.19)
PLATELET # BLD AUTO: 204 K/UL (ref 130–400)
POTASSIUM SERPL-SCNC: 3.6 MMOL/L (ref 3.5–5.1)
PROT SERPL-MCNC: 7.6 G/DL (ref 6–8.3)
RBC # BLD AUTO: 4.83 MIL/UL (ref 4.5–6.2)
SODIUM SERPL-SCNC: 136 MMOL/L (ref 136–145)
WBC # BLD AUTO: 10 K/UL (ref 4.8–10.8)

## 2020-07-19 RX ADMIN — Medication SCH MG: at 21:00

## 2020-07-19 RX ADMIN — SODIUM CHLORIDE SCH MLS/HR: 9 INJECTION, SOLUTION INTRAVENOUS at 20:00

## 2020-07-19 RX ADMIN — SIMVASTATIN SCH MG: 20 TABLET, FILM COATED ORAL at 21:00

## 2020-07-19 NOTE — NUR
cm note

call made to listed next of kin daughter heidy alegria  [phone] and to [phone]. no answer. 
will continue to f/u.

## 2020-07-20 VITALS — SYSTOLIC BLOOD PRESSURE: 150 MMHG | DIASTOLIC BLOOD PRESSURE: 76 MMHG

## 2020-07-20 LAB
ALBUMIN SERPL-MCNC: 3 G/DL (ref 3.5–5)
ALT SERPL-CCNC: 24 U/L (ref 12–78)
AST SERPL-CCNC: 29 U/L (ref 10–37)
BASE EXCESS BLDA CALC-SCNC: 3.4 MMOL/L (ref -2–3)
BASOPHILS NFR BLD AUTO: 0.3 % (ref 0–5)
BILIRUB SERPL-MCNC: 0.5 MG/DL (ref 0.2–1)
BUN SERPL-MCNC: 26 MG/DL (ref 7–18)
CHLORIDE SERPL-SCNC: 100 MMOL/L (ref 101–111)
CO2 SERPL-SCNC: 32 MMOL/L (ref 21–32)
CREAT SERPL-MCNC: 1.2 MG/DL (ref 0.5–1.5)
CRP SERPL HS-MCNC: 46.7 MG/L (ref 0–9)
EOSINOPHIL NFR BLD AUTO: 0.3 % (ref 0–8)
ERYTHROCYTE [DISTWIDTH] IN BLOOD BY AUTOMATED COUNT: 13.2 % (ref 11–15.5)
GFR SERPL CREATININE-BSD FRML MDRD: 64 ML/MIN (ref 60–?)
GLUCOSE SERPL-MCNC: 183 MG/DL (ref 70–105)
HCO3 BLDA-SCNC: 26.7 MMOL/L (ref 21–28)
HCT VFR BLD AUTO: 42.3 % (ref 42–54)
LYMPHOCYTES NFR SPEC AUTO: 3.2 % (ref 21–51)
MCH RBC QN AUTO: 31 PG (ref 27–33)
MCHC RBC AUTO-ENTMCNC: 34 G/DL (ref 32–36)
MCV RBC AUTO: 91 FL (ref 79–99)
MONOCYTES NFR BLD AUTO: 3.6 % (ref 3–13)
NEUTROPHILS NFR BLD AUTO: 92.3 % (ref 40–77)
NRBC BLD MANUAL-RTO: 0 % (ref 0–0.19)
PCO2 BLDA: 37 MMHG (ref 35–48)
PLATELET # BLD AUTO: 212 K/UL (ref 130–400)
POTASSIUM SERPL-SCNC: 3.2 MMOL/L (ref 3.5–5.1)
PROT SERPL-MCNC: 7.3 G/DL (ref 6–8.3)
RBC # BLD AUTO: 4.65 MIL/UL (ref 4.5–6.2)
SAO2 % BLDA: 90.6 % (ref 95–99)
SODIUM SERPL-SCNC: 137 MMOL/L (ref 136–145)
WBC # BLD AUTO: 12 K/UL (ref 4.8–10.8)

## 2020-07-20 RX ADMIN — METHYLPREDNISOLONE SODIUM SUCCINATE SCH MG: 40 INJECTION, POWDER, FOR SOLUTION INTRAMUSCULAR; INTRAVENOUS at 21:00

## 2020-07-20 RX ADMIN — SODIUM CHLORIDE SCH MLS/HR: 9 INJECTION, SOLUTION INTRAVENOUS at 20:00

## 2020-07-20 RX ADMIN — ALBUTEROL SULFATE SCH INH: 90 AEROSOL, METERED RESPIRATORY (INHALATION) at 23:20

## 2020-07-20 RX ADMIN — Medication SCH MG: at 21:00

## 2020-07-20 RX ADMIN — FUROSEMIDE SCH MG: 10 INJECTION INTRAVENOUS at 09:00

## 2020-07-20 RX ADMIN — OXYCODONE HYDROCHLORIDE AND ACETAMINOPHEN SCH MG: 500 TABLET ORAL at 09:00

## 2020-07-20 RX ADMIN — SIMVASTATIN SCH MG: 20 TABLET, FILM COATED ORAL at 21:00

## 2020-07-20 RX ADMIN — AZITHROMYCIN MONOHYDRATE SCH MLS/HR: 500 INJECTION, POWDER, LYOPHILIZED, FOR SOLUTION INTRAVENOUS at 16:15

## 2020-07-20 RX ADMIN — ENOXAPARIN SODIUM SCH MG: 60 INJECTION SUBCUTANEOUS at 21:00

## 2020-07-20 RX ADMIN — BENZONATATE SCH MG: 100 CAPSULE ORAL at 21:00

## 2020-07-20 RX ADMIN — SODIUM CHLORIDE SCH UNIT: 9 INJECTION, SOLUTION INTRAVENOUS at 21:00

## 2020-07-20 RX ADMIN — METHYLPREDNISOLONE SODIUM SUCCINATE SCH MG: 40 INJECTION, POWDER, FOR SOLUTION INTRAMUSCULAR; INTRAVENOUS at 14:00

## 2020-07-20 RX ADMIN — SUCRALFATE SCH GM: 1 TABLET ORAL at 21:00

## 2020-07-20 RX ADMIN — LOSARTAN POTASSIUM SCH MG: 50 TABLET, FILM COATED ORAL at 09:00

## 2020-07-20 RX ADMIN — ALBUTEROL SULFATE SCH INH: 90 AEROSOL, METERED RESPIRATORY (INHALATION) at 18:00

## 2020-07-20 RX ADMIN — ASPIRIN TAB DELAYED RELEASE 81 MG SCH MG: 81 TABLET DELAYED RESPONSE at 09:00

## 2020-07-21 VITALS — DIASTOLIC BLOOD PRESSURE: 70 MMHG | SYSTOLIC BLOOD PRESSURE: 130 MMHG

## 2020-07-21 VITALS — SYSTOLIC BLOOD PRESSURE: 134 MMHG | DIASTOLIC BLOOD PRESSURE: 52 MMHG

## 2020-07-21 VITALS — DIASTOLIC BLOOD PRESSURE: 79 MMHG | SYSTOLIC BLOOD PRESSURE: 127 MMHG

## 2020-07-21 VITALS — SYSTOLIC BLOOD PRESSURE: 125 MMHG | DIASTOLIC BLOOD PRESSURE: 46 MMHG

## 2020-07-21 VITALS — SYSTOLIC BLOOD PRESSURE: 125 MMHG | DIASTOLIC BLOOD PRESSURE: 57 MMHG

## 2020-07-21 VITALS — DIASTOLIC BLOOD PRESSURE: 71 MMHG | SYSTOLIC BLOOD PRESSURE: 123 MMHG

## 2020-07-21 LAB
BASOPHILS NFR BLD AUTO: 0.1 % (ref 0–5)
BUN SERPL-MCNC: 30 MG/DL (ref 7–18)
CHLORIDE SERPL-SCNC: 103 MMOL/L (ref 101–111)
CO2 SERPL-SCNC: 30 MMOL/L (ref 21–32)
CREAT SERPL-MCNC: 1.1 MG/DL (ref 0.5–1.5)
EOSINOPHIL NFR BLD AUTO: 0 % (ref 0–8)
ERYTHROCYTE [DISTWIDTH] IN BLOOD BY AUTOMATED COUNT: 13 % (ref 11–15.5)
GFR SERPL CREATININE-BSD FRML MDRD: 71 ML/MIN (ref 60–?)
GLUCOSE SERPL-MCNC: 181 MG/DL (ref 70–105)
HCT VFR BLD AUTO: 40.4 % (ref 42–54)
LYMPHOCYTES NFR SPEC AUTO: 3.7 % (ref 21–51)
MCH RBC QN AUTO: 30.9 PG (ref 27–33)
MCHC RBC AUTO-ENTMCNC: 34.2 G/DL (ref 32–36)
MCV RBC AUTO: 90.6 FL (ref 79–99)
MONOCYTES NFR BLD AUTO: 4.5 % (ref 3–13)
NEUTROPHILS NFR BLD AUTO: 91.3 % (ref 40–77)
NRBC BLD MANUAL-RTO: 0 % (ref 0–0.19)
PLATELET # BLD AUTO: 198 K/UL (ref 130–400)
POTASSIUM SERPL-SCNC: 3.7 MMOL/L (ref 3.5–5.1)
RBC # BLD AUTO: 4.46 MIL/UL (ref 4.5–6.2)
SODIUM SERPL-SCNC: 138 MMOL/L (ref 136–145)
WBC # BLD AUTO: 8.9 K/UL (ref 4.8–10.8)

## 2020-07-21 RX ADMIN — BENZONATATE SCH MG: 100 CAPSULE ORAL at 08:53

## 2020-07-21 RX ADMIN — Medication SCH MG: at 20:51

## 2020-07-21 RX ADMIN — SUCRALFATE SCH GM: 1 TABLET ORAL at 08:53

## 2020-07-21 RX ADMIN — ALBUTEROL SULFATE SCH INH: 90 AEROSOL, METERED RESPIRATORY (INHALATION) at 05:31

## 2020-07-21 RX ADMIN — ASPIRIN TAB DELAYED RELEASE 81 MG SCH MG: 81 TABLET DELAYED RESPONSE at 08:54

## 2020-07-21 RX ADMIN — ALBUTEROL SULFATE SCH INH: 90 AEROSOL, METERED RESPIRATORY (INHALATION) at 19:58

## 2020-07-21 RX ADMIN — ENOXAPARIN SODIUM SCH MG: 60 INJECTION SUBCUTANEOUS at 08:55

## 2020-07-21 RX ADMIN — BENZONATATE SCH MG: 100 CAPSULE ORAL at 14:53

## 2020-07-21 RX ADMIN — OXYCODONE HYDROCHLORIDE AND ACETAMINOPHEN SCH MG: 500 TABLET ORAL at 08:54

## 2020-07-21 RX ADMIN — SODIUM CHLORIDE SCH UNIT: 9 INJECTION, SOLUTION INTRAVENOUS at 05:32

## 2020-07-21 RX ADMIN — ACETAMINOPHEN PRN MG: 325 TABLET, FILM COATED ORAL at 23:18

## 2020-07-21 RX ADMIN — METHYLPREDNISOLONE SODIUM SUCCINATE SCH MG: 40 INJECTION, POWDER, FOR SOLUTION INTRAMUSCULAR; INTRAVENOUS at 14:53

## 2020-07-21 RX ADMIN — BENZONATATE SCH MG: 100 CAPSULE ORAL at 20:44

## 2020-07-21 RX ADMIN — SUCRALFATE SCH GM: 1 TABLET ORAL at 20:44

## 2020-07-21 RX ADMIN — METHYLPREDNISOLONE SODIUM SUCCINATE SCH MG: 40 INJECTION, POWDER, FOR SOLUTION INTRAMUSCULAR; INTRAVENOUS at 20:44

## 2020-07-21 RX ADMIN — SODIUM CHLORIDE SCH UNIT: 9 INJECTION, SOLUTION INTRAVENOUS at 21:00

## 2020-07-21 RX ADMIN — AZITHROMYCIN MONOHYDRATE SCH MLS/HR: 500 INJECTION, POWDER, LYOPHILIZED, FOR SOLUTION INTRAVENOUS at 17:09

## 2020-07-21 RX ADMIN — LOSARTAN POTASSIUM SCH MG: 50 TABLET, FILM COATED ORAL at 08:53

## 2020-07-21 RX ADMIN — METHYLPREDNISOLONE SODIUM SUCCINATE SCH MG: 40 INJECTION, POWDER, FOR SOLUTION INTRAMUSCULAR; INTRAVENOUS at 08:54

## 2020-07-21 RX ADMIN — SUCRALFATE SCH GM: 1 TABLET ORAL at 12:42

## 2020-07-21 RX ADMIN — SODIUM CHLORIDE SCH MLS/HR: 9 INJECTION, SOLUTION INTRAVENOUS at 20:38

## 2020-07-21 RX ADMIN — SODIUM CHLORIDE SCH UNIT: 9 INJECTION, SOLUTION INTRAVENOUS at 11:30

## 2020-07-21 RX ADMIN — Medication SCH MG: at 08:54

## 2020-07-21 RX ADMIN — SODIUM CHLORIDE SCH UNIT: 9 INJECTION, SOLUTION INTRAVENOUS at 17:11

## 2020-07-21 RX ADMIN — SIMVASTATIN SCH MG: 20 TABLET, FILM COATED ORAL at 20:45

## 2020-07-21 RX ADMIN — FUROSEMIDE SCH MG: 10 INJECTION INTRAVENOUS at 08:55

## 2020-07-21 RX ADMIN — SUCRALFATE SCH GM: 1 TABLET ORAL at 17:09

## 2020-07-21 RX ADMIN — GUAIFENESIN AND DEXTROMETHORPHAN PRN ML: 100; 10 SYRUP ORAL at 23:25

## 2020-07-21 RX ADMIN — Medication SCH MG: at 20:45

## 2020-07-21 NOTE — NUR
ASSESSMENT

PATIENT ALERT AND ORIENTED TIMES 4.  no complaints of any pain.  patient came to the floor 
on 15 liters nonrebreather.  sating 92%.  patients daughter just called and said that the 
patient felt hot and like he couldnt catch his breath.  checked his oxygen level and patient 
was sating 90% on the 15 liters nonrebreather.  had patient lay on side and oxygen went to 
97%.  called md and got an order for anxiety.  will give 1mg of morphine

## 2020-07-21 NOTE — NUR
plasma

cpp unit started at this time, instructed patient to call for any change in condition 
increased sob, back pain, rash, itching, etc. Verbalized understanding. Refer to transfusion 
sheet for details.

## 2020-07-21 NOTE — NUR
CM NOTE/IA

UNABLE TO MEET WITH PATIENT IN ROOM D/T RESTRICTION FOR COVID POSITIVE. AS PER NURSE, 
CORRECT PHONE NUMBER FOR DAUGHTER -[phone], DAREK CRUZ. CALLED DAUGHTER, AS PER 
DAUGHTER, PATIENT LIVES WITH HER AND SON IN [place], INDEPENDENT WITH ADLS, USES CANE, DRIVES TO 
APPOINTMENTS, AND FEELS SAFE FOR PATIENT TO RETURN HOME. SECOND NEXT OF KIN ADDED: TUAN CLEMENTE [phone]. FACESHEET FAXED TO REGISTRATION FOR UPDATE. 

-------------------------------------------------------------------------------

Addendum: 07/21/20 at 1334 by JORGE LUIS MARSHALL RN CM

-------------------------------------------------------------------------------

Amended: Links added.

## 2020-07-22 VITALS — SYSTOLIC BLOOD PRESSURE: 124 MMHG | DIASTOLIC BLOOD PRESSURE: 75 MMHG

## 2020-07-22 VITALS — DIASTOLIC BLOOD PRESSURE: 87 MMHG | SYSTOLIC BLOOD PRESSURE: 160 MMHG

## 2020-07-22 VITALS — SYSTOLIC BLOOD PRESSURE: 112 MMHG | DIASTOLIC BLOOD PRESSURE: 50 MMHG

## 2020-07-22 VITALS — DIASTOLIC BLOOD PRESSURE: 75 MMHG | SYSTOLIC BLOOD PRESSURE: 141 MMHG

## 2020-07-22 VITALS — SYSTOLIC BLOOD PRESSURE: 112 MMHG | DIASTOLIC BLOOD PRESSURE: 53 MMHG

## 2020-07-22 VITALS — DIASTOLIC BLOOD PRESSURE: 50 MMHG | SYSTOLIC BLOOD PRESSURE: 110 MMHG

## 2020-07-22 RX ADMIN — ALBUTEROL SULFATE SCH INH: 90 AEROSOL, METERED RESPIRATORY (INHALATION) at 12:20

## 2020-07-22 RX ADMIN — SUCRALFATE SCH GM: 1 TABLET ORAL at 06:17

## 2020-07-22 RX ADMIN — ACETAMINOPHEN PRN MG: 325 TABLET, FILM COATED ORAL at 12:31

## 2020-07-22 RX ADMIN — ALBUTEROL SULFATE SCH INH: 90 AEROSOL, METERED RESPIRATORY (INHALATION) at 23:46

## 2020-07-22 RX ADMIN — AZITHROMYCIN MONOHYDRATE SCH MLS/HR: 500 INJECTION, POWDER, LYOPHILIZED, FOR SOLUTION INTRAVENOUS at 16:19

## 2020-07-22 RX ADMIN — SUCRALFATE SCH GM: 1 TABLET ORAL at 12:18

## 2020-07-22 RX ADMIN — SODIUM CHLORIDE SCH UNIT: 9 INJECTION, SOLUTION INTRAVENOUS at 11:30

## 2020-07-22 RX ADMIN — METHYLPREDNISOLONE SODIUM SUCCINATE SCH MG: 40 INJECTION, POWDER, FOR SOLUTION INTRAMUSCULAR; INTRAVENOUS at 13:42

## 2020-07-22 RX ADMIN — ACETAMINOPHEN AND CODEINE PHOSPHATE PRN TAB: 300; 30 TABLET ORAL at 21:14

## 2020-07-22 RX ADMIN — ASPIRIN TAB DELAYED RELEASE 81 MG SCH MG: 81 TABLET DELAYED RESPONSE at 08:16

## 2020-07-22 RX ADMIN — POTASSIUM CHLORIDE PRN MEQ: 1500 TABLET, EXTENDED RELEASE ORAL at 12:19

## 2020-07-22 RX ADMIN — METHYLPREDNISOLONE SODIUM SUCCINATE SCH MG: 40 INJECTION, POWDER, FOR SOLUTION INTRAMUSCULAR; INTRAVENOUS at 08:14

## 2020-07-22 RX ADMIN — LOSARTAN POTASSIUM SCH MG: 50 TABLET, FILM COATED ORAL at 08:16

## 2020-07-22 RX ADMIN — OXYCODONE HYDROCHLORIDE AND ACETAMINOPHEN SCH MG: 500 TABLET ORAL at 08:16

## 2020-07-22 RX ADMIN — SUCRALFATE SCH GM: 1 TABLET ORAL at 21:10

## 2020-07-22 RX ADMIN — SODIUM CHLORIDE SCH UNIT: 9 INJECTION, SOLUTION INTRAVENOUS at 21:11

## 2020-07-22 RX ADMIN — GUAIFENESIN AND DEXTROMETHORPHAN PRN ML: 100; 10 SYRUP ORAL at 03:45

## 2020-07-22 RX ADMIN — HYDROXYZINE HYDROCHLORIDE PRN MG: 25 TABLET, FILM COATED ORAL at 23:48

## 2020-07-22 RX ADMIN — ENOXAPARIN SODIUM SCH MG: 60 INJECTION SUBCUTANEOUS at 08:18

## 2020-07-22 RX ADMIN — ALBUTEROL SULFATE SCH INH: 90 AEROSOL, METERED RESPIRATORY (INHALATION) at 00:54

## 2020-07-22 RX ADMIN — Medication SCH MG: at 08:15

## 2020-07-22 RX ADMIN — BENZONATATE SCH MG: 100 CAPSULE ORAL at 21:10

## 2020-07-22 RX ADMIN — WATER SCH GM: 1 INJECTION INTRAVENOUS at 13:41

## 2020-07-22 RX ADMIN — SIMVASTATIN SCH MG: 20 TABLET, FILM COATED ORAL at 21:10

## 2020-07-22 RX ADMIN — POTASSIUM CHLORIDE PRN MEQ: 1500 TABLET, EXTENDED RELEASE ORAL at 08:24

## 2020-07-22 RX ADMIN — SODIUM CHLORIDE SCH UNIT: 9 INJECTION, SOLUTION INTRAVENOUS at 16:24

## 2020-07-22 RX ADMIN — BENZONATATE SCH MG: 100 CAPSULE ORAL at 08:15

## 2020-07-22 RX ADMIN — SODIUM CHLORIDE SCH UNIT: 9 INJECTION, SOLUTION INTRAVENOUS at 06:16

## 2020-07-22 RX ADMIN — ALBUTEROL SULFATE SCH INH: 90 AEROSOL, METERED RESPIRATORY (INHALATION) at 17:42

## 2020-07-22 RX ADMIN — ALBUTEROL SULFATE SCH INH: 90 AEROSOL, METERED RESPIRATORY (INHALATION) at 06:17

## 2020-07-22 RX ADMIN — BENZONATATE SCH MG: 100 CAPSULE ORAL at 13:42

## 2020-07-22 RX ADMIN — METHYLPREDNISOLONE SODIUM SUCCINATE SCH MG: 40 INJECTION, POWDER, FOR SOLUTION INTRAMUSCULAR; INTRAVENOUS at 21:09

## 2020-07-22 RX ADMIN — Medication SCH MG: at 21:10

## 2020-07-22 RX ADMIN — FUROSEMIDE SCH MG: 10 INJECTION INTRAVENOUS at 08:15

## 2020-07-22 RX ADMIN — WATER SCH GM: 1 INJECTION INTRAVENOUS at 21:12

## 2020-07-22 RX ADMIN — SUCRALFATE SCH GM: 1 TABLET ORAL at 16:19

## 2020-07-22 RX ADMIN — SODIUM CHLORIDE SCH MLS/HR: 9 INJECTION, SOLUTION INTRAVENOUS at 21:09

## 2020-07-22 NOTE — NUR
AM ASSESSMENT

PT AWAKE, ALERT, AND ORIENTED, ANXIOUS AT TIMES.  STATES CANT BREATH, O2 SAT 92-94%.  
RELAXATION TECHNIQUES GIVEN, POSITIVE REDIRECTION GIVEN, PT MORE CALM.  PT ENCOURAGED TO 
PRONE, REFUSES.  O2 PER NON-REBREATHER MASK.

## 2020-07-23 VITALS — SYSTOLIC BLOOD PRESSURE: 116 MMHG | DIASTOLIC BLOOD PRESSURE: 50 MMHG

## 2020-07-23 VITALS — DIASTOLIC BLOOD PRESSURE: 80 MMHG | SYSTOLIC BLOOD PRESSURE: 128 MMHG

## 2020-07-23 VITALS — SYSTOLIC BLOOD PRESSURE: 119 MMHG | DIASTOLIC BLOOD PRESSURE: 76 MMHG

## 2020-07-23 VITALS — DIASTOLIC BLOOD PRESSURE: 44 MMHG | SYSTOLIC BLOOD PRESSURE: 105 MMHG

## 2020-07-23 VITALS — SYSTOLIC BLOOD PRESSURE: 146 MMHG | DIASTOLIC BLOOD PRESSURE: 65 MMHG

## 2020-07-23 VITALS — SYSTOLIC BLOOD PRESSURE: 163 MMHG | DIASTOLIC BLOOD PRESSURE: 80 MMHG

## 2020-07-23 LAB
ALBUMIN SERPL-MCNC: 2.7 G/DL (ref 3.5–5)
ALT SERPL-CCNC: 39 U/L (ref 12–78)
AST SERPL-CCNC: 29 U/L (ref 10–37)
BASOPHILS NFR BLD AUTO: 0.1 % (ref 0–5)
BILIRUB SERPL-MCNC: 0.6 MG/DL (ref 0.2–1)
BUN SERPL-MCNC: 34 MG/DL (ref 7–18)
CHLORIDE SERPL-SCNC: 103 MMOL/L (ref 101–111)
CO2 SERPL-SCNC: 28 MMOL/L (ref 21–32)
CREAT SERPL-MCNC: 1 MG/DL (ref 0.5–1.5)
CRP SERPL HS-MCNC: 40.7 MG/L (ref 0–9)
EOSINOPHIL NFR BLD AUTO: 0 % (ref 0–8)
ERYTHROCYTE [DISTWIDTH] IN BLOOD BY AUTOMATED COUNT: 12.6 % (ref 11–15.5)
GFR SERPL CREATININE-BSD FRML MDRD: 79 ML/MIN (ref 60–?)
GLUCOSE SERPL-MCNC: 153 MG/DL (ref 70–105)
HCT VFR BLD AUTO: 43.7 % (ref 42–54)
LDH SERPL-CCNC: 376 U/L (ref 81–234)
LYMPHOCYTES NFR SPEC AUTO: 1.9 % (ref 21–51)
MCH RBC QN AUTO: 31 PG (ref 27–33)
MCHC RBC AUTO-ENTMCNC: 33.9 G/DL (ref 32–36)
MCV RBC AUTO: 91.6 FL (ref 79–99)
MONOCYTES NFR BLD AUTO: 3.2 % (ref 3–13)
NEUTROPHILS NFR BLD AUTO: 94.3 % (ref 40–77)
NRBC BLD MANUAL-RTO: 0 % (ref 0–0.19)
PLATELET # BLD AUTO: 253 K/UL (ref 130–400)
POTASSIUM SERPL-SCNC: 3.5 MMOL/L (ref 3.5–5.1)
PROT SERPL-MCNC: 6.8 G/DL (ref 6–8.3)
RBC # BLD AUTO: 4.77 MIL/UL (ref 4.5–6.2)
SODIUM SERPL-SCNC: 140 MMOL/L (ref 136–145)
WBC # BLD AUTO: 12.7 K/UL (ref 4.8–10.8)

## 2020-07-23 RX ADMIN — BENZONATATE SCH MG: 100 CAPSULE ORAL at 13:59

## 2020-07-23 RX ADMIN — OXYCODONE HYDROCHLORIDE AND ACETAMINOPHEN SCH MG: 500 TABLET ORAL at 08:28

## 2020-07-23 RX ADMIN — BENZONATATE SCH MG: 100 CAPSULE ORAL at 08:30

## 2020-07-23 RX ADMIN — POTASSIUM CHLORIDE PRN MEQ: 1500 TABLET, EXTENDED RELEASE ORAL at 08:29

## 2020-07-23 RX ADMIN — METHYLPREDNISOLONE SODIUM SUCCINATE SCH MG: 40 INJECTION, POWDER, FOR SOLUTION INTRAMUSCULAR; INTRAVENOUS at 08:28

## 2020-07-23 RX ADMIN — SODIUM CHLORIDE PRN MG: 9 INJECTION, SOLUTION INTRAVENOUS at 23:11

## 2020-07-23 RX ADMIN — Medication SCH MG: at 20:08

## 2020-07-23 RX ADMIN — ALBUTEROL SULFATE SCH INH: 90 AEROSOL, METERED RESPIRATORY (INHALATION) at 12:14

## 2020-07-23 RX ADMIN — METHYLPREDNISOLONE SODIUM SUCCINATE SCH MG: 40 INJECTION, POWDER, FOR SOLUTION INTRAMUSCULAR; INTRAVENOUS at 20:07

## 2020-07-23 RX ADMIN — LOSARTAN POTASSIUM SCH MG: 50 TABLET, FILM COATED ORAL at 08:29

## 2020-07-23 RX ADMIN — HYDROXYZINE HYDROCHLORIDE PRN MG: 25 TABLET, FILM COATED ORAL at 08:30

## 2020-07-23 RX ADMIN — SODIUM CHLORIDE SCH UNIT: 9 INJECTION, SOLUTION INTRAVENOUS at 06:19

## 2020-07-23 RX ADMIN — METHYLPREDNISOLONE SODIUM SUCCINATE SCH MG: 40 INJECTION, POWDER, FOR SOLUTION INTRAMUSCULAR; INTRAVENOUS at 14:08

## 2020-07-23 RX ADMIN — SIMVASTATIN SCH MG: 20 TABLET, FILM COATED ORAL at 20:08

## 2020-07-23 RX ADMIN — ALBUTEROL SULFATE SCH INH: 90 AEROSOL, METERED RESPIRATORY (INHALATION) at 17:45

## 2020-07-23 RX ADMIN — FUROSEMIDE SCH MG: 10 INJECTION INTRAVENOUS at 08:28

## 2020-07-23 RX ADMIN — DIAZEPAM PRN MG: 5 TABLET ORAL at 12:13

## 2020-07-23 RX ADMIN — SODIUM CHLORIDE SCH UNIT: 9 INJECTION, SOLUTION INTRAVENOUS at 17:45

## 2020-07-23 RX ADMIN — SUCRALFATE SCH GM: 1 TABLET ORAL at 20:07

## 2020-07-23 RX ADMIN — ASPIRIN TAB DELAYED RELEASE 81 MG SCH MG: 81 TABLET DELAYED RESPONSE at 08:29

## 2020-07-23 RX ADMIN — SODIUM CHLORIDE SCH UNIT: 9 INJECTION, SOLUTION INTRAVENOUS at 20:21

## 2020-07-23 RX ADMIN — SUCRALFATE SCH GM: 1 TABLET ORAL at 17:36

## 2020-07-23 RX ADMIN — GUAIFENESIN AND DEXTROMETHORPHAN PRN ML: 100; 10 SYRUP ORAL at 20:55

## 2020-07-23 RX ADMIN — ENOXAPARIN SODIUM SCH MG: 60 INJECTION SUBCUTANEOUS at 08:28

## 2020-07-23 RX ADMIN — GUAIFENESIN AND DEXTROMETHORPHAN PRN ML: 100; 10 SYRUP ORAL at 17:45

## 2020-07-23 RX ADMIN — ALBUTEROL SULFATE SCH INH: 90 AEROSOL, METERED RESPIRATORY (INHALATION) at 06:19

## 2020-07-23 RX ADMIN — SODIUM CHLORIDE SCH UNIT: 9 INJECTION, SOLUTION INTRAVENOUS at 11:30

## 2020-07-23 RX ADMIN — Medication SCH MG: at 08:30

## 2020-07-23 RX ADMIN — SUCRALFATE SCH GM: 1 TABLET ORAL at 12:13

## 2020-07-23 RX ADMIN — SUCRALFATE SCH GM: 1 TABLET ORAL at 08:28

## 2020-07-23 RX ADMIN — DIAZEPAM PRN MG: 5 TABLET ORAL at 18:41

## 2020-07-23 RX ADMIN — BENZONATATE SCH MG: 100 CAPSULE ORAL at 20:08

## 2020-07-23 NOTE — NUR
assessment

Pt is alert and oriented times 4 no complaints of any pain.  the pt. told me that the 
Tessalon pearls were not helping with his cough.  So I got him some Robitussin and gave it 
to him around 8 pm.   at 2310 patient complains of his stomach is bothering him.  He said it 
is because of the robitussin.  I gave him some zofran to see if it will help him.  I will 
continue monitor.

## 2020-07-23 NOTE — NUR
AM ASSESSMENT

PT AWAKE, ALERT, AND ORIENTED. EPISODES OF ANXIETY AT TIMES, POSITIVE REDIRECTION GIVEN.  PT 
STATES HAVING EPISODES OF SOB AT TIMES, O2 PER NON-REBREATHER, O2 SAT 91-93%, PT ENCOURAGED 
TO PRONE, REFUSES.  PO INTAKE ENCOURAGED.  CALL LIGHT WITHIN REACH

## 2020-07-23 NOTE — NUR
MORE CALM

PT AWAKE AND ALERT, PT MORE CALM AT THIS TIME.  O2 PER NON-REBREATHER MASK, O2 SAT 92%.  
CALL LIGHT WITHIN REACH.

## 2020-07-24 VITALS — DIASTOLIC BLOOD PRESSURE: 68 MMHG | SYSTOLIC BLOOD PRESSURE: 96 MMHG

## 2020-07-24 VITALS — SYSTOLIC BLOOD PRESSURE: 100 MMHG | DIASTOLIC BLOOD PRESSURE: 61 MMHG

## 2020-07-24 VITALS — SYSTOLIC BLOOD PRESSURE: 120 MMHG | DIASTOLIC BLOOD PRESSURE: 73 MMHG

## 2020-07-24 VITALS — SYSTOLIC BLOOD PRESSURE: 142 MMHG | DIASTOLIC BLOOD PRESSURE: 68 MMHG

## 2020-07-24 VITALS — DIASTOLIC BLOOD PRESSURE: 54 MMHG | SYSTOLIC BLOOD PRESSURE: 119 MMHG

## 2020-07-24 LAB
ALBUMIN SERPL-MCNC: 2.5 G/DL (ref 3.5–5)
ALT SERPL-CCNC: 38 U/L (ref 12–78)
AST SERPL-CCNC: 27 U/L (ref 10–37)
BASE EXCESS BLDA CALC-SCNC: 1.7 MMOL/L (ref -2–3)
BASOPHILS NFR BLD AUTO: 0.1 % (ref 0–5)
BILIRUB SERPL-MCNC: 0.7 MG/DL (ref 0.2–1)
BUN SERPL-MCNC: 41 MG/DL (ref 7–18)
CHLORIDE SERPL-SCNC: 104 MMOL/L (ref 101–111)
CO2 SERPL-SCNC: 30 MMOL/L (ref 21–32)
CREAT SERPL-MCNC: 1.1 MG/DL (ref 0.5–1.5)
CRP SERPL HS-MCNC: 115.8 MG/L (ref 0–9)
EOSINOPHIL NFR BLD AUTO: 0 % (ref 0–8)
ERYTHROCYTE [DISTWIDTH] IN BLOOD BY AUTOMATED COUNT: 12.9 % (ref 11–15.5)
GFR SERPL CREATININE-BSD FRML MDRD: 71 ML/MIN (ref 60–?)
GLUCOSE SERPL-MCNC: 150 MG/DL (ref 70–105)
HCO3 BLDA-SCNC: 25.5 MMOL/L (ref 21–28)
HCT VFR BLD AUTO: 44.2 % (ref 42–54)
LDH SERPL-CCNC: 373 U/L (ref 81–234)
LYMPHOCYTES NFR SPEC AUTO: 2.6 % (ref 21–51)
MCH RBC QN AUTO: 30.3 PG (ref 27–33)
MCHC RBC AUTO-ENTMCNC: 33 G/DL (ref 32–36)
MCV RBC AUTO: 91.7 FL (ref 79–99)
MONOCYTES NFR BLD AUTO: 2.3 % (ref 3–13)
NEUTROPHILS NFR BLD AUTO: 94.4 % (ref 40–77)
NRBC BLD MANUAL-RTO: 0 % (ref 0–0.19)
PCO2 BLDA: 37 MMHG (ref 35–48)
PLATELET # BLD AUTO: 246 K/UL (ref 130–400)
POTASSIUM SERPL-SCNC: 4.1 MMOL/L (ref 3.5–5.1)
PROT SERPL-MCNC: 6.8 G/DL (ref 6–8.3)
RBC # BLD AUTO: 4.82 MIL/UL (ref 4.5–6.2)
SAO2 % BLDA: 90.4 % (ref 95–99)
SODIUM SERPL-SCNC: 140 MMOL/L (ref 136–145)
WBC # BLD AUTO: 14.2 K/UL (ref 4.8–10.8)

## 2020-07-24 PROCEDURE — 5A09557 ASSISTANCE WITH RESPIRATORY VENTILATION, GREATER THAN 96 CONSECUTIVE HOURS, CONTINUOUS POSITIVE AIRWAY PRESSURE: ICD-10-PCS | Performed by: INTERNAL MEDICINE

## 2020-07-24 RX ADMIN — BENZONATATE SCH MG: 100 CAPSULE ORAL at 15:02

## 2020-07-24 RX ADMIN — ALBUTEROL SULFATE SCH INH: 90 AEROSOL, METERED RESPIRATORY (INHALATION) at 23:11

## 2020-07-24 RX ADMIN — DIAZEPAM PRN MG: 5 TABLET ORAL at 23:11

## 2020-07-24 RX ADMIN — SUCRALFATE SCH GM: 1 TABLET ORAL at 19:56

## 2020-07-24 RX ADMIN — SODIUM CHLORIDE SCH UNIT: 9 INJECTION, SOLUTION INTRAVENOUS at 19:59

## 2020-07-24 RX ADMIN — ENOXAPARIN SODIUM SCH MG: 60 INJECTION SUBCUTANEOUS at 08:48

## 2020-07-24 RX ADMIN — SUCRALFATE SCH GM: 1 TABLET ORAL at 18:00

## 2020-07-24 RX ADMIN — BENZONATATE SCH MG: 100 CAPSULE ORAL at 08:47

## 2020-07-24 RX ADMIN — LOSARTAN POTASSIUM SCH MG: 50 TABLET, FILM COATED ORAL at 08:48

## 2020-07-24 RX ADMIN — FUROSEMIDE SCH MG: 10 INJECTION INTRAVENOUS at 08:47

## 2020-07-24 RX ADMIN — SUCRALFATE SCH GM: 1 TABLET ORAL at 12:28

## 2020-07-24 RX ADMIN — DIAZEPAM PRN MG: 5 TABLET ORAL at 12:29

## 2020-07-24 RX ADMIN — METHYLPREDNISOLONE SODIUM SUCCINATE SCH MG: 40 INJECTION, POWDER, FOR SOLUTION INTRAMUSCULAR; INTRAVENOUS at 08:47

## 2020-07-24 RX ADMIN — METHYLPREDNISOLONE SODIUM SUCCINATE SCH MG: 40 INJECTION, POWDER, FOR SOLUTION INTRAMUSCULAR; INTRAVENOUS at 15:02

## 2020-07-24 RX ADMIN — ASPIRIN TAB DELAYED RELEASE 81 MG SCH MG: 81 TABLET DELAYED RESPONSE at 08:48

## 2020-07-24 RX ADMIN — ALBUTEROL SULFATE SCH INH: 90 AEROSOL, METERED RESPIRATORY (INHALATION) at 18:00

## 2020-07-24 RX ADMIN — ALBUTEROL SULFATE SCH INH: 90 AEROSOL, METERED RESPIRATORY (INHALATION) at 06:00

## 2020-07-24 RX ADMIN — Medication SCH MG: at 08:48

## 2020-07-24 RX ADMIN — Medication SCH MG: at 19:56

## 2020-07-24 RX ADMIN — SIMVASTATIN SCH MG: 20 TABLET, FILM COATED ORAL at 19:56

## 2020-07-24 RX ADMIN — SUCRALFATE SCH GM: 1 TABLET ORAL at 08:47

## 2020-07-24 RX ADMIN — SODIUM CHLORIDE SCH UNIT: 9 INJECTION, SOLUTION INTRAVENOUS at 06:01

## 2020-07-24 RX ADMIN — SODIUM CHLORIDE SCH UNIT: 9 INJECTION, SOLUTION INTRAVENOUS at 17:46

## 2020-07-24 RX ADMIN — OXYCODONE HYDROCHLORIDE AND ACETAMINOPHEN SCH MG: 500 TABLET ORAL at 08:48

## 2020-07-24 RX ADMIN — ALBUTEROL SULFATE SCH INH: 90 AEROSOL, METERED RESPIRATORY (INHALATION) at 00:00

## 2020-07-24 RX ADMIN — ALBUTEROL SULFATE SCH INH: 90 AEROSOL, METERED RESPIRATORY (INHALATION) at 12:00

## 2020-07-24 RX ADMIN — METHYLPREDNISOLONE SODIUM SUCCINATE SCH MG: 40 INJECTION, POWDER, FOR SOLUTION INTRAMUSCULAR; INTRAVENOUS at 19:56

## 2020-07-24 RX ADMIN — Medication SCH MG: at 12:28

## 2020-07-24 RX ADMIN — SODIUM CHLORIDE SCH UNIT: 9 INJECTION, SOLUTION INTRAVENOUS at 11:30

## 2020-07-24 RX ADMIN — BENZONATATE SCH MG: 100 CAPSULE ORAL at 19:56

## 2020-07-24 NOTE — NUR
NOTE

PATIENT AAOX3. DENIES PAIN BUT SEEMS VERY SOB. RESPIRATORY RATE 45. O2 SATS 88-92% ON BIPAP 
100%FIO2. HE WAS SWITCHED TO BIPAP OVERNIGHT DUE TO INCREASED SOB. PATIENT ALSO HAD ABG'S 
THIS AM AND PO2 IN THE 50'S. WILL CONTINUE WITH BIPAP AND SEE WHAT PULMONOLOGIST RECOMMENDS 
LATER WHEN HE ROUNDS. 

-------------------------------------------------------------------------------

Addendum: 07/24/20 at 1637 by OSITO HECK RN

-------------------------------------------------------------------------------

NOTE INTENDED FOR 1000 TODAY.

## 2020-07-24 NOTE — NUR
bipap

called [facility] and got an order for bipap and an abg scheduled for 8 am.  patient 
sating 93% on bipap

## 2020-07-24 NOTE — NUR
RDSCREEN - LOS X 7



Pt with COVID-19 infection. S/p Plasma Tx, Remdesivir. Pt tolerating Heart Healthy diet 
order with no report of GI distress, Fair PO intake. WBC 14.2, BUN 41, , Alb 2.5, LDH 
373. Obesity class I. Bipap in place. 



Recommend continue Heart Healthy diet order 

Recommend 60mL ProMod QD



RD to continue to monitor. Please notify as additional nutrition concerns arise. Thank you.

## 2020-07-24 NOTE — NUR
more o2

patient was requiring more oxygen than the 15 liters on the nonrebreather.  I called 
respiratory and he put the patient on high flow in addition to the nonrebreather.  the 
respiratory therapist was still having difficulties getting the oxygen saturation above 85%  
respiratory is going to get a bipap at this time.

## 2020-07-24 NOTE — NUR
NOTE

NOTIFIED HOUSE SUPERVISOR YURI ABOUT ORDER FOR TRANSFER TO ICU AS PER DR RUVALCABA 
RECOMMENDATIONS BUT ORDER WRITTEN PER PRIMARY MD DR SCHAFFER. CURRENTLY BIPAP SAME SETTINGS 
FIO2 100% SATS 88-92%. FINALLY AGREED TO RECEIVE VALIUM PRN FOR HER HAD REFUSED IT IN AM AND 
HIS RESPIRATORY RATE IS IN THE LOW 30'S. WAS BREATHING AVERAGE OF 42-45 TIMES PER MINUTE 
THIS AM. WAS ALSO STARTED ON PPI DAILY AND SIMETHICONE PRN.

## 2020-07-25 VITALS — SYSTOLIC BLOOD PRESSURE: 112 MMHG | DIASTOLIC BLOOD PRESSURE: 72 MMHG

## 2020-07-25 VITALS — DIASTOLIC BLOOD PRESSURE: 56 MMHG | SYSTOLIC BLOOD PRESSURE: 109 MMHG

## 2020-07-25 VITALS — SYSTOLIC BLOOD PRESSURE: 103 MMHG | DIASTOLIC BLOOD PRESSURE: 65 MMHG

## 2020-07-25 VITALS — DIASTOLIC BLOOD PRESSURE: 57 MMHG | SYSTOLIC BLOOD PRESSURE: 104 MMHG

## 2020-07-25 VITALS — SYSTOLIC BLOOD PRESSURE: 127 MMHG | DIASTOLIC BLOOD PRESSURE: 59 MMHG

## 2020-07-25 VITALS — SYSTOLIC BLOOD PRESSURE: 100 MMHG | DIASTOLIC BLOOD PRESSURE: 65 MMHG

## 2020-07-25 VITALS — DIASTOLIC BLOOD PRESSURE: 65 MMHG | SYSTOLIC BLOOD PRESSURE: 104 MMHG

## 2020-07-25 VITALS — SYSTOLIC BLOOD PRESSURE: 98 MMHG | DIASTOLIC BLOOD PRESSURE: 69 MMHG

## 2020-07-25 VITALS — SYSTOLIC BLOOD PRESSURE: 107 MMHG | DIASTOLIC BLOOD PRESSURE: 49 MMHG

## 2020-07-25 VITALS — SYSTOLIC BLOOD PRESSURE: 94 MMHG | DIASTOLIC BLOOD PRESSURE: 52 MMHG

## 2020-07-25 LAB
ALBUMIN SERPL-MCNC: 2.4 G/DL (ref 3.5–5)
ALT SERPL-CCNC: 34 U/L (ref 12–78)
AST SERPL-CCNC: 20 U/L (ref 10–37)
BASE EXCESS BLDA CALC-SCNC: 1.6 MMOL/L (ref -2–3)
BASOPHILS NFR BLD AUTO: 0.1 % (ref 0–5)
BILIRUB SERPL-MCNC: 0.4 MG/DL (ref 0.2–1)
BUN SERPL-MCNC: 55 MG/DL (ref 7–18)
CHLORIDE SERPL-SCNC: 106 MMOL/L (ref 101–111)
CO2 SERPL-SCNC: 29 MMOL/L (ref 21–32)
CREAT SERPL-MCNC: 1.2 MG/DL (ref 0.5–1.5)
CRP SERPL HS-MCNC: 168.8 MG/L (ref 0–9)
EOSINOPHIL NFR BLD AUTO: 0 % (ref 0–8)
ERYTHROCYTE [DISTWIDTH] IN BLOOD BY AUTOMATED COUNT: 13.1 % (ref 11–15.5)
GFR SERPL CREATININE-BSD FRML MDRD: 64 ML/MIN (ref 60–?)
GLUCOSE SERPL-MCNC: 188 MG/DL (ref 70–105)
HCO3 BLDA-SCNC: 25.4 MMOL/L (ref 21–28)
HCT VFR BLD AUTO: 41.3 % (ref 42–54)
LDH SERPL-CCNC: 332 U/L (ref 81–234)
LYMPHOCYTES NFR SPEC AUTO: 2.2 % (ref 21–51)
MCH RBC QN AUTO: 30.9 PG (ref 27–33)
MCHC RBC AUTO-ENTMCNC: 33.4 G/DL (ref 32–36)
MCV RBC AUTO: 92.6 FL (ref 79–99)
MONOCYTES NFR BLD AUTO: 1.3 % (ref 3–13)
NEUTROPHILS NFR BLD AUTO: 95.9 % (ref 40–77)
NRBC BLD MANUAL-RTO: 0 % (ref 0–0.19)
PCO2 BLDA: 38 MMHG (ref 35–48)
PLATELET # BLD AUTO: 206 K/UL (ref 130–400)
POTASSIUM SERPL-SCNC: 3.9 MMOL/L (ref 3.5–5.1)
PROT SERPL-MCNC: 6.6 G/DL (ref 6–8.3)
RBC # BLD AUTO: 4.46 MIL/UL (ref 4.5–6.2)
SAO2 % BLDA: 93 % (ref 95–99)
SODIUM SERPL-SCNC: 141 MMOL/L (ref 136–145)
WBC # BLD AUTO: 11.6 K/UL (ref 4.8–10.8)

## 2020-07-25 RX ADMIN — ASPIRIN TAB DELAYED RELEASE 81 MG SCH MG: 81 TABLET DELAYED RESPONSE at 10:05

## 2020-07-25 RX ADMIN — METHYLPREDNISOLONE SODIUM SUCCINATE SCH MG: 40 INJECTION, POWDER, FOR SOLUTION INTRAMUSCULAR; INTRAVENOUS at 13:38

## 2020-07-25 RX ADMIN — SUCRALFATE SCH GM: 1 TABLET ORAL at 19:58

## 2020-07-25 RX ADMIN — ALBUTEROL SULFATE SCH INH: 90 AEROSOL, METERED RESPIRATORY (INHALATION) at 13:26

## 2020-07-25 RX ADMIN — SODIUM CHLORIDE SCH UNIT: 9 INJECTION, SOLUTION INTRAVENOUS at 11:30

## 2020-07-25 RX ADMIN — Medication SCH MG: at 10:11

## 2020-07-25 RX ADMIN — LOSARTAN POTASSIUM SCH MG: 50 TABLET, FILM COATED ORAL at 10:04

## 2020-07-25 RX ADMIN — BENZONATATE SCH MG: 100 CAPSULE ORAL at 10:04

## 2020-07-25 RX ADMIN — ALBUTEROL SULFATE SCH INH: 90 AEROSOL, METERED RESPIRATORY (INHALATION) at 05:26

## 2020-07-25 RX ADMIN — Medication SCH MG: at 19:58

## 2020-07-25 RX ADMIN — DIAZEPAM PRN MG: 5 TABLET ORAL at 10:04

## 2020-07-25 RX ADMIN — Medication SCH MG: at 10:07

## 2020-07-25 RX ADMIN — ALBUTEROL SULFATE SCH INH: 90 AEROSOL, METERED RESPIRATORY (INHALATION) at 23:07

## 2020-07-25 RX ADMIN — METHYLPREDNISOLONE SODIUM SUCCINATE SCH MG: 40 INJECTION, POWDER, FOR SOLUTION INTRAMUSCULAR; INTRAVENOUS at 19:57

## 2020-07-25 RX ADMIN — SUCRALFATE SCH GM: 1 TABLET ORAL at 10:10

## 2020-07-25 RX ADMIN — DIAZEPAM PRN MG: 5 TABLET ORAL at 17:30

## 2020-07-25 RX ADMIN — PANTOPRAZOLE SODIUM SCH MG: 40 TABLET, DELAYED RELEASE ORAL at 10:06

## 2020-07-25 RX ADMIN — SUCRALFATE SCH GM: 1 TABLET ORAL at 05:40

## 2020-07-25 RX ADMIN — SODIUM CHLORIDE SCH UNIT: 9 INJECTION, SOLUTION INTRAVENOUS at 05:40

## 2020-07-25 RX ADMIN — BENZONATATE SCH MG: 100 CAPSULE ORAL at 19:58

## 2020-07-25 RX ADMIN — BENZONATATE SCH MG: 100 CAPSULE ORAL at 13:38

## 2020-07-25 RX ADMIN — ENOXAPARIN SODIUM SCH MG: 60 INJECTION SUBCUTANEOUS at 10:10

## 2020-07-25 RX ADMIN — SIMVASTATIN SCH MG: 20 TABLET, FILM COATED ORAL at 19:58

## 2020-07-25 RX ADMIN — SODIUM CHLORIDE SCH UNIT: 9 INJECTION, SOLUTION INTRAVENOUS at 16:30

## 2020-07-25 RX ADMIN — SODIUM CHLORIDE SCH MLS/HR: 9 INJECTION, SOLUTION INTRAVENOUS at 19:57

## 2020-07-25 RX ADMIN — Medication SCH MG: at 19:59

## 2020-07-25 RX ADMIN — ALBUTEROL SULFATE SCH INH: 90 AEROSOL, METERED RESPIRATORY (INHALATION) at 17:08

## 2020-07-25 RX ADMIN — SUCRALFATE SCH GM: 1 TABLET ORAL at 13:38

## 2020-07-25 RX ADMIN — SODIUM CHLORIDE SCH UNIT: 9 INJECTION, SOLUTION INTRAVENOUS at 20:00

## 2020-07-25 RX ADMIN — ENOXAPARIN SODIUM SCH MG: 100 INJECTION SUBCUTANEOUS at 21:45

## 2020-07-25 RX ADMIN — METHYLPREDNISOLONE SODIUM SUCCINATE SCH MG: 40 INJECTION, POWDER, FOR SOLUTION INTRAMUSCULAR; INTRAVENOUS at 10:06

## 2020-07-25 RX ADMIN — OXYCODONE HYDROCHLORIDE AND ACETAMINOPHEN SCH MG: 500 TABLET ORAL at 10:03

## 2020-07-25 NOTE — NUR
assessment

Pt. is alert and oriented times 4 although has a lot of anxiety at times.  no complaints of 
any pain.  patient is on cpap sating inthe mid to low 90's.  pt. got his anti viral last 
night and 1 unit of plasma.  morning blood sugar was 160 no coverage needed.  vitals are 
stable will continue to monitor.

## 2020-07-26 VITALS — DIASTOLIC BLOOD PRESSURE: 68 MMHG | SYSTOLIC BLOOD PRESSURE: 128 MMHG

## 2020-07-26 VITALS — SYSTOLIC BLOOD PRESSURE: 112 MMHG | DIASTOLIC BLOOD PRESSURE: 70 MMHG

## 2020-07-26 RX ADMIN — OXYCODONE HYDROCHLORIDE AND ACETAMINOPHEN SCH MG: 500 TABLET ORAL at 10:04

## 2020-07-26 RX ADMIN — BENZONATATE SCH MG: 100 CAPSULE ORAL at 14:00

## 2020-07-26 RX ADMIN — ENOXAPARIN SODIUM SCH MG: 100 INJECTION SUBCUTANEOUS at 10:05

## 2020-07-26 RX ADMIN — Medication SCH MG: at 20:50

## 2020-07-26 RX ADMIN — SUCRALFATE SCH GM: 1 TABLET ORAL at 20:51

## 2020-07-26 RX ADMIN — SODIUM CHLORIDE PRN MG: 9 INJECTION, SOLUTION INTRAVENOUS at 18:02

## 2020-07-26 RX ADMIN — SODIUM CHLORIDE SCH UNIT: 9 INJECTION, SOLUTION INTRAVENOUS at 11:46

## 2020-07-26 RX ADMIN — METHYLPREDNISOLONE SODIUM SUCCINATE SCH MG: 40 INJECTION, POWDER, FOR SOLUTION INTRAMUSCULAR; INTRAVENOUS at 20:54

## 2020-07-26 RX ADMIN — ENOXAPARIN SODIUM SCH MG: 100 INJECTION SUBCUTANEOUS at 20:52

## 2020-07-26 RX ADMIN — DIAZEPAM PRN MG: 5 TABLET ORAL at 02:32

## 2020-07-26 RX ADMIN — SODIUM CHLORIDE SCH MLS/HR: 9 INJECTION, SOLUTION INTRAVENOUS at 20:50

## 2020-07-26 RX ADMIN — LOSARTAN POTASSIUM SCH MG: 50 TABLET, FILM COATED ORAL at 10:04

## 2020-07-26 RX ADMIN — ALBUTEROL SULFATE SCH INH: 90 AEROSOL, METERED RESPIRATORY (INHALATION) at 11:02

## 2020-07-26 RX ADMIN — BENZONATATE SCH MG: 100 CAPSULE ORAL at 13:55

## 2020-07-26 RX ADMIN — Medication SCH MG: at 10:04

## 2020-07-26 RX ADMIN — PANTOPRAZOLE SODIUM SCH MG: 40 TABLET, DELAYED RELEASE ORAL at 10:04

## 2020-07-26 RX ADMIN — SUCRALFATE SCH GM: 1 TABLET ORAL at 11:45

## 2020-07-26 RX ADMIN — BENZONATATE SCH MG: 100 CAPSULE ORAL at 20:55

## 2020-07-26 RX ADMIN — SIMVASTATIN SCH MG: 20 TABLET, FILM COATED ORAL at 20:51

## 2020-07-26 RX ADMIN — SUCRALFATE SCH GM: 1 TABLET ORAL at 17:27

## 2020-07-26 RX ADMIN — BENZONATATE SCH MG: 100 CAPSULE ORAL at 10:04

## 2020-07-26 RX ADMIN — Medication SCH MG: at 20:51

## 2020-07-26 RX ADMIN — BISACODYL PRN MG: 10 SUPPOSITORY RECTAL at 15:36

## 2020-07-26 RX ADMIN — SODIUM CHLORIDE SCH UNIT: 9 INJECTION, SOLUTION INTRAVENOUS at 20:56

## 2020-07-26 RX ADMIN — SUCRALFATE SCH GM: 1 TABLET ORAL at 05:49

## 2020-07-26 RX ADMIN — ALBUTEROL SULFATE SCH INH: 90 AEROSOL, METERED RESPIRATORY (INHALATION) at 17:48

## 2020-07-26 RX ADMIN — ALBUTEROL SULFATE SCH INH: 90 AEROSOL, METERED RESPIRATORY (INHALATION) at 05:49

## 2020-07-26 RX ADMIN — Medication SCH MG: at 10:05

## 2020-07-26 RX ADMIN — SODIUM CHLORIDE SCH UNIT: 9 INJECTION, SOLUTION INTRAVENOUS at 16:30

## 2020-07-26 RX ADMIN — METHYLPREDNISOLONE SODIUM SUCCINATE SCH MG: 40 INJECTION, POWDER, FOR SOLUTION INTRAMUSCULAR; INTRAVENOUS at 10:05

## 2020-07-26 RX ADMIN — ALBUTEROL SULFATE SCH INH: 90 AEROSOL, METERED RESPIRATORY (INHALATION) at 23:26

## 2020-07-26 RX ADMIN — SODIUM CHLORIDE SCH UNIT: 9 INJECTION, SOLUTION INTRAVENOUS at 05:50

## 2020-07-26 RX ADMIN — ASPIRIN TAB DELAYED RELEASE 81 MG SCH MG: 81 TABLET DELAYED RESPONSE at 10:04

## 2020-07-26 RX ADMIN — METHYLPREDNISOLONE SODIUM SUCCINATE SCH MG: 40 INJECTION, POWDER, FOR SOLUTION INTRAMUSCULAR; INTRAVENOUS at 13:55

## 2020-07-26 NOTE — NUR
Pt c/o constipation. Called XENIA Graham order Dulcolax suppository. Administered medication 
and informed pt to call when ready to use bed pan.

## 2020-07-26 NOTE — NUR
assessment

patient is alert and oriented times 4.  no complaints of any pain.  patent is sating 95 % on 
cpap.  morning blood sugar was 151 no coverage needed rain water iscoming inthe room 
ceiling.  Pt is to be moved to the 2nd floor. vitals are stable will continue to monitor.

## 2020-07-27 VITALS — DIASTOLIC BLOOD PRESSURE: 68 MMHG | SYSTOLIC BLOOD PRESSURE: 117 MMHG

## 2020-07-27 VITALS — DIASTOLIC BLOOD PRESSURE: 76 MMHG | SYSTOLIC BLOOD PRESSURE: 122 MMHG

## 2020-07-27 VITALS — SYSTOLIC BLOOD PRESSURE: 116 MMHG | DIASTOLIC BLOOD PRESSURE: 74 MMHG

## 2020-07-27 VITALS — DIASTOLIC BLOOD PRESSURE: 57 MMHG | SYSTOLIC BLOOD PRESSURE: 102 MMHG

## 2020-07-27 VITALS — SYSTOLIC BLOOD PRESSURE: 117 MMHG | DIASTOLIC BLOOD PRESSURE: 67 MMHG

## 2020-07-27 VITALS — SYSTOLIC BLOOD PRESSURE: 113 MMHG | DIASTOLIC BLOOD PRESSURE: 61 MMHG

## 2020-07-27 LAB
BASE EXCESS BLDA CALC-SCNC: 2.4 MMOL/L (ref -2–3)
BASOPHILS NFR BLD AUTO: 0.1 % (ref 0–5)
BUN SERPL-MCNC: 41 MG/DL (ref 7–18)
CHLORIDE SERPL-SCNC: 107 MMOL/L (ref 101–111)
CO2 SERPL-SCNC: 29 MMOL/L (ref 21–32)
CREAT SERPL-MCNC: 1.1 MG/DL (ref 0.5–1.5)
EOSINOPHIL NFR BLD AUTO: 0 % (ref 0–8)
ERYTHROCYTE [DISTWIDTH] IN BLOOD BY AUTOMATED COUNT: 12.9 % (ref 11–15.5)
GFR SERPL CREATININE-BSD FRML MDRD: 71 ML/MIN (ref 60–?)
GLUCOSE SERPL-MCNC: 169 MG/DL (ref 70–105)
HCO3 BLDA-SCNC: 26.5 MMOL/L (ref 21–28)
HCT VFR BLD AUTO: 39.7 % (ref 42–54)
LYMPHOCYTES NFR SPEC AUTO: 1.6 % (ref 21–51)
MCH RBC QN AUTO: 31.4 PG (ref 27–33)
MCHC RBC AUTO-ENTMCNC: 33.8 G/DL (ref 32–36)
MCV RBC AUTO: 93 FL (ref 79–99)
MONOCYTES NFR BLD AUTO: 1.5 % (ref 3–13)
NEUTROPHILS NFR BLD AUTO: 96.5 % (ref 40–77)
NRBC BLD MANUAL-RTO: 0 % (ref 0–0.19)
PCO2 BLDA: 39 MMHG (ref 35–48)
PLATELET # BLD AUTO: 188 K/UL (ref 130–400)
POTASSIUM SERPL-SCNC: 4.2 MMOL/L (ref 3.5–5.1)
RBC # BLD AUTO: 4.27 MIL/UL (ref 4.5–6.2)
SAO2 % BLDA: 95.7 % (ref 95–99)
SODIUM SERPL-SCNC: 141 MMOL/L (ref 136–145)
WBC # BLD AUTO: 11.3 K/UL (ref 4.8–10.8)

## 2020-07-27 RX ADMIN — ACETAMINOPHEN AND CODEINE PHOSPHATE PRN TAB: 300; 30 TABLET ORAL at 17:20

## 2020-07-27 RX ADMIN — ALBUTEROL SULFATE SCH INH: 90 AEROSOL, METERED RESPIRATORY (INHALATION) at 17:23

## 2020-07-27 RX ADMIN — ENOXAPARIN SODIUM SCH MG: 100 INJECTION SUBCUTANEOUS at 07:42

## 2020-07-27 RX ADMIN — SODIUM CHLORIDE SCH UNIT: 9 INJECTION, SOLUTION INTRAVENOUS at 11:30

## 2020-07-27 RX ADMIN — ENOXAPARIN SODIUM SCH MG: 100 INJECTION SUBCUTANEOUS at 21:44

## 2020-07-27 RX ADMIN — SODIUM CHLORIDE SCH UNIT: 9 INJECTION, SOLUTION INTRAVENOUS at 17:23

## 2020-07-27 RX ADMIN — METHYLPREDNISOLONE SODIUM SUCCINATE SCH MG: 40 INJECTION, POWDER, FOR SOLUTION INTRAMUSCULAR; INTRAVENOUS at 21:43

## 2020-07-27 RX ADMIN — BENZONATATE SCH MG: 100 CAPSULE ORAL at 07:41

## 2020-07-27 RX ADMIN — BENZONATATE SCH MG: 100 CAPSULE ORAL at 14:29

## 2020-07-27 RX ADMIN — Medication SCH MG: at 07:43

## 2020-07-27 RX ADMIN — METHYLPREDNISOLONE SODIUM SUCCINATE SCH MG: 40 INJECTION, POWDER, FOR SOLUTION INTRAMUSCULAR; INTRAVENOUS at 07:41

## 2020-07-27 RX ADMIN — OXYCODONE HYDROCHLORIDE AND ACETAMINOPHEN SCH MG: 500 TABLET ORAL at 07:41

## 2020-07-27 RX ADMIN — DIAZEPAM PRN MG: 5 TABLET ORAL at 14:00

## 2020-07-27 RX ADMIN — SIMVASTATIN SCH MG: 20 TABLET, FILM COATED ORAL at 21:44

## 2020-07-27 RX ADMIN — BENZONATATE SCH MG: 100 CAPSULE ORAL at 21:44

## 2020-07-27 RX ADMIN — ASPIRIN TAB DELAYED RELEASE 81 MG SCH MG: 81 TABLET DELAYED RESPONSE at 07:41

## 2020-07-27 RX ADMIN — SUCRALFATE SCH GM: 1 TABLET ORAL at 11:44

## 2020-07-27 RX ADMIN — SUCRALFATE SCH GM: 1 TABLET ORAL at 21:44

## 2020-07-27 RX ADMIN — PANTOPRAZOLE SODIUM SCH MG: 40 TABLET, DELAYED RELEASE ORAL at 07:42

## 2020-07-27 RX ADMIN — Medication SCH MG: at 21:45

## 2020-07-27 RX ADMIN — SUCRALFATE SCH GM: 1 TABLET ORAL at 06:31

## 2020-07-27 RX ADMIN — LOSARTAN POTASSIUM SCH MG: 50 TABLET, FILM COATED ORAL at 07:43

## 2020-07-27 RX ADMIN — Medication SCH MG: at 07:41

## 2020-07-27 RX ADMIN — ALBUTEROL SULFATE SCH INH: 90 AEROSOL, METERED RESPIRATORY (INHALATION) at 11:44

## 2020-07-27 RX ADMIN — Medication SCH MG: at 21:44

## 2020-07-27 RX ADMIN — ALBUTEROL SULFATE SCH INH: 90 AEROSOL, METERED RESPIRATORY (INHALATION) at 06:06

## 2020-07-27 RX ADMIN — SODIUM CHLORIDE SCH UNIT: 9 INJECTION, SOLUTION INTRAVENOUS at 22:57

## 2020-07-27 RX ADMIN — METHYLPREDNISOLONE SODIUM SUCCINATE SCH MG: 40 INJECTION, POWDER, FOR SOLUTION INTRAMUSCULAR; INTRAVENOUS at 14:29

## 2020-07-27 RX ADMIN — SODIUM CHLORIDE SCH UNIT: 9 INJECTION, SOLUTION INTRAVENOUS at 06:31

## 2020-07-27 RX ADMIN — SUCRALFATE SCH GM: 1 TABLET ORAL at 17:19

## 2020-07-28 VITALS — DIASTOLIC BLOOD PRESSURE: 62 MMHG | SYSTOLIC BLOOD PRESSURE: 119 MMHG

## 2020-07-28 VITALS — DIASTOLIC BLOOD PRESSURE: 66 MMHG | SYSTOLIC BLOOD PRESSURE: 112 MMHG

## 2020-07-28 VITALS — DIASTOLIC BLOOD PRESSURE: 68 MMHG | SYSTOLIC BLOOD PRESSURE: 118 MMHG

## 2020-07-28 VITALS — DIASTOLIC BLOOD PRESSURE: 52 MMHG | SYSTOLIC BLOOD PRESSURE: 100 MMHG

## 2020-07-28 VITALS — SYSTOLIC BLOOD PRESSURE: 103 MMHG | DIASTOLIC BLOOD PRESSURE: 66 MMHG

## 2020-07-28 VITALS — DIASTOLIC BLOOD PRESSURE: 69 MMHG | SYSTOLIC BLOOD PRESSURE: 115 MMHG

## 2020-07-28 LAB
ALBUMIN SERPL-MCNC: 2.2 G/DL (ref 3.5–5)
ALT SERPL-CCNC: 27 U/L (ref 12–78)
AST SERPL-CCNC: 17 U/L (ref 10–37)
BASE EXCESS BLDA CALC-SCNC: 1.4 MMOL/L (ref -2–3)
BASOPHILS NFR BLD AUTO: 0.1 % (ref 0–5)
BILIRUB SERPL-MCNC: 0.6 MG/DL (ref 0.2–1)
BUN SERPL-MCNC: 37 MG/DL (ref 7–18)
CHLORIDE SERPL-SCNC: 105 MMOL/L (ref 101–111)
CK SERPL-CCNC: 62 U/L (ref 21–232)
CO2 SERPL-SCNC: 30 MMOL/L (ref 21–32)
CREAT SERPL-MCNC: 1 MG/DL (ref 0.5–1.5)
CRP SERPL HS-MCNC: 39.4 MG/L (ref 0–9)
EOSINOPHIL NFR BLD AUTO: 0 % (ref 0–8)
ERYTHROCYTE [DISTWIDTH] IN BLOOD BY AUTOMATED COUNT: 12.8 % (ref 11–15.5)
GFR SERPL CREATININE-BSD FRML MDRD: 79 ML/MIN (ref 60–?)
GLUCOSE SERPL-MCNC: 184 MG/DL (ref 70–105)
HCO3 BLDA-SCNC: 25.7 MMOL/L (ref 21–28)
HCT VFR BLD AUTO: 42.3 % (ref 42–54)
INR PPP: 1.01 (ref 0.85–1.15)
LDH SERPL-CCNC: 408 U/L (ref 81–234)
LYMPHOCYTES NFR SPEC AUTO: 1.9 % (ref 21–51)
MAGNESIUM SERPL-MCNC: 2.6 MG/DL (ref 1.8–2.4)
MCH RBC QN AUTO: 30.5 PG (ref 27–33)
MCHC RBC AUTO-ENTMCNC: 32.9 G/DL (ref 32–36)
MCV RBC AUTO: 93 FL (ref 79–99)
MONOCYTES NFR BLD AUTO: 1.3 % (ref 3–13)
MYOGLOBIN SERPL-MCNC: 128 NG/ML (ref 10–92)
NEUTROPHILS NFR BLD AUTO: 96.3 % (ref 40–77)
NRBC BLD MANUAL-RTO: 0 % (ref 0–0.19)
PCO2 BLDA: 40 MMHG (ref 35–48)
PHOSPHATE SERPL-MCNC: 4 MG/DL (ref 2.5–4.9)
PLATELET # BLD AUTO: 175 K/UL (ref 130–400)
POTASSIUM SERPL-SCNC: 4.4 MMOL/L (ref 3.5–5.1)
PROT SERPL-MCNC: 6.3 G/DL (ref 6–8.3)
PROTHROMBIN TIME: 10.9 SEC (ref 9.6–11.6)
RBC # BLD AUTO: 4.55 MIL/UL (ref 4.5–6.2)
SAO2 % BLDA: 92.4 % (ref 95–99)
SODIUM SERPL-SCNC: 139 MMOL/L (ref 136–145)
TROPONIN I SERPL-MCNC: < 0.04 NG/ML (ref 0–0.06)
WBC # BLD AUTO: 11.3 K/UL (ref 4.8–10.8)

## 2020-07-28 PROCEDURE — 02HV33Z INSERTION OF INFUSION DEVICE INTO SUPERIOR VENA CAVA, PERCUTANEOUS APPROACH: ICD-10-PCS | Performed by: INTERNAL MEDICINE

## 2020-07-28 RX ADMIN — DIAZEPAM PRN MG: 5 TABLET ORAL at 05:23

## 2020-07-28 RX ADMIN — ASPIRIN TAB DELAYED RELEASE 81 MG SCH MG: 81 TABLET DELAYED RESPONSE at 09:02

## 2020-07-28 RX ADMIN — SODIUM CHLORIDE SCH UNIT: 9 INJECTION, SOLUTION INTRAVENOUS at 11:30

## 2020-07-28 RX ADMIN — OXYCODONE HYDROCHLORIDE AND ACETAMINOPHEN SCH MG: 500 TABLET ORAL at 09:02

## 2020-07-28 RX ADMIN — METHYLPREDNISOLONE SODIUM SUCCINATE SCH MG: 40 INJECTION, POWDER, FOR SOLUTION INTRAMUSCULAR; INTRAVENOUS at 09:02

## 2020-07-28 RX ADMIN — ENOXAPARIN SODIUM SCH MG: 100 INJECTION SUBCUTANEOUS at 09:02

## 2020-07-28 RX ADMIN — Medication SCH MG: at 20:12

## 2020-07-28 RX ADMIN — SUCRALFATE SCH GM: 1 TABLET ORAL at 05:15

## 2020-07-28 RX ADMIN — SODIUM CHLORIDE SCH UNIT: 9 INJECTION, SOLUTION INTRAVENOUS at 05:15

## 2020-07-28 RX ADMIN — SIMVASTATIN SCH MG: 20 TABLET, FILM COATED ORAL at 20:11

## 2020-07-28 RX ADMIN — ENOXAPARIN SODIUM SCH MG: 100 INJECTION SUBCUTANEOUS at 20:15

## 2020-07-28 RX ADMIN — DIAZEPAM PRN MG: 5 TABLET ORAL at 23:11

## 2020-07-28 RX ADMIN — SODIUM CHLORIDE SCH UNIT: 9 INJECTION, SOLUTION INTRAVENOUS at 16:14

## 2020-07-28 RX ADMIN — BENZONATATE SCH MG: 100 CAPSULE ORAL at 09:01

## 2020-07-28 RX ADMIN — ALBUTEROL SULFATE SCH INH: 90 AEROSOL, METERED RESPIRATORY (INHALATION) at 13:42

## 2020-07-28 RX ADMIN — ALBUTEROL SULFATE SCH INH: 90 AEROSOL, METERED RESPIRATORY (INHALATION) at 05:16

## 2020-07-28 RX ADMIN — ALBUTEROL SULFATE SCH INH: 90 AEROSOL, METERED RESPIRATORY (INHALATION) at 00:32

## 2020-07-28 RX ADMIN — ALBUTEROL SULFATE SCH INH: 90 AEROSOL, METERED RESPIRATORY (INHALATION) at 23:10

## 2020-07-28 RX ADMIN — Medication SCH MG: at 09:02

## 2020-07-28 RX ADMIN — METHYLPREDNISOLONE SODIUM SUCCINATE SCH MG: 40 INJECTION, POWDER, FOR SOLUTION INTRAMUSCULAR; INTRAVENOUS at 20:20

## 2020-07-28 RX ADMIN — SUCRALFATE SCH GM: 1 TABLET ORAL at 16:39

## 2020-07-28 RX ADMIN — BENZONATATE SCH MG: 100 CAPSULE ORAL at 20:11

## 2020-07-28 RX ADMIN — METHYLPREDNISOLONE SODIUM SUCCINATE SCH MG: 40 INJECTION, POWDER, FOR SOLUTION INTRAMUSCULAR; INTRAVENOUS at 14:00

## 2020-07-28 RX ADMIN — SODIUM CHLORIDE SCH MLS/HR: 9 INJECTION, SOLUTION INTRAVENOUS at 18:17

## 2020-07-28 RX ADMIN — NITROGLYCERIN SCH INCH: 20 OINTMENT TOPICAL at 22:00

## 2020-07-28 RX ADMIN — Medication SCH MG: at 20:11

## 2020-07-28 RX ADMIN — SUCRALFATE SCH GM: 1 TABLET ORAL at 13:42

## 2020-07-28 RX ADMIN — SODIUM CHLORIDE SCH UNIT: 9 INJECTION, SOLUTION INTRAVENOUS at 20:20

## 2020-07-28 RX ADMIN — PANTOPRAZOLE SODIUM SCH MG: 40 TABLET, DELAYED RELEASE ORAL at 09:02

## 2020-07-28 RX ADMIN — ALBUTEROL SULFATE SCH INH: 90 AEROSOL, METERED RESPIRATORY (INHALATION) at 17:30

## 2020-07-28 RX ADMIN — BENZONATATE SCH MG: 100 CAPSULE ORAL at 14:00

## 2020-07-28 RX ADMIN — LOSARTAN POTASSIUM SCH MG: 50 TABLET, FILM COATED ORAL at 09:03

## 2020-07-28 RX ADMIN — SUCRALFATE SCH GM: 1 TABLET ORAL at 20:12

## 2020-07-28 NOTE — NUR
Pt signed consent for PICC line and it is in the chart, PT/INR completed, will give 
remdesivir when line is inserted.

## 2020-07-28 NOTE — NUR
CHEST PAIN

PT C/O RIGHT SIDE MIDSTERNAL CHEST PAIN DESCRIBED AS PRESSURE, REPRODUCIBLE AND REPORTS 
WORSE WITH COUGHING. 12 LEAD EKG COMPLETED ON CALL PHYSICIAN PAGED: DR MILLER AWAITING 
CALL BACK. 

-------------------------------------------------------------------------------

Addendum: 07/29/20 at 0310 by TUESDAYE CURLING, RN RN

-------------------------------------------------------------------------------

7/28/2020 2200 RETURN CALL FROM [facility], NOTIFIED OF ABOVE AND ORDERS RECEIVED FOR NITRO 
PASTE, CARDIAC LABS Q6 X3 WITH EKG AND ROBITUSSIN WITH CODEINE.

## 2020-07-29 VITALS — SYSTOLIC BLOOD PRESSURE: 105 MMHG | DIASTOLIC BLOOD PRESSURE: 60 MMHG

## 2020-07-29 VITALS — DIASTOLIC BLOOD PRESSURE: 72 MMHG | SYSTOLIC BLOOD PRESSURE: 119 MMHG

## 2020-07-29 VITALS — SYSTOLIC BLOOD PRESSURE: 107 MMHG | DIASTOLIC BLOOD PRESSURE: 73 MMHG

## 2020-07-29 VITALS — DIASTOLIC BLOOD PRESSURE: 58 MMHG | SYSTOLIC BLOOD PRESSURE: 101 MMHG

## 2020-07-29 VITALS — SYSTOLIC BLOOD PRESSURE: 122 MMHG | DIASTOLIC BLOOD PRESSURE: 68 MMHG

## 2020-07-29 VITALS — SYSTOLIC BLOOD PRESSURE: 116 MMHG | DIASTOLIC BLOOD PRESSURE: 69 MMHG

## 2020-07-29 VITALS — DIASTOLIC BLOOD PRESSURE: 77 MMHG | SYSTOLIC BLOOD PRESSURE: 99 MMHG

## 2020-07-29 LAB
BASOPHILS NFR BLD AUTO: 0.1 % (ref 0–5)
BUN SERPL-MCNC: 36 MG/DL (ref 7–18)
CHLORIDE SERPL-SCNC: 107 MMOL/L (ref 101–111)
CK SERPL-CCNC: 46 U/L (ref 21–232)
CK SERPL-CCNC: 49 U/L (ref 21–232)
CO2 SERPL-SCNC: 29 MMOL/L (ref 21–32)
CREAT SERPL-MCNC: 0.9 MG/DL (ref 0.5–1.5)
CRP SERPL HS-MCNC: 47.3 MG/L (ref 0–9)
EOSINOPHIL NFR BLD AUTO: 0 % (ref 0–8)
ERYTHROCYTE [DISTWIDTH] IN BLOOD BY AUTOMATED COUNT: 12.7 % (ref 11–15.5)
GFR SERPL CREATININE-BSD FRML MDRD: 89 ML/MIN (ref 60–?)
GLUCOSE SERPL-MCNC: 209 MG/DL (ref 70–105)
HCT VFR BLD AUTO: 39.9 % (ref 42–54)
LDH SERPL-CCNC: 587 U/L (ref 81–234)
LYMPHOCYTES NFR SPEC AUTO: 1.3 % (ref 21–51)
MCH RBC QN AUTO: 30.4 PG (ref 27–33)
MCHC RBC AUTO-ENTMCNC: 33.1 G/DL (ref 32–36)
MCV RBC AUTO: 91.9 FL (ref 79–99)
MONOCYTES NFR BLD AUTO: 1.1 % (ref 3–13)
MYOGLOBIN SERPL-MCNC: 104 NG/ML (ref 10–92)
MYOGLOBIN SERPL-MCNC: 110 NG/ML (ref 10–92)
NEUTROPHILS NFR BLD AUTO: 96.9 % (ref 40–77)
NRBC BLD MANUAL-RTO: 0 % (ref 0–0.19)
PLATELET # BLD AUTO: 146 K/UL (ref 130–400)
POTASSIUM SERPL-SCNC: 4.9 MMOL/L (ref 3.5–5.1)
RBC # BLD AUTO: 4.34 MIL/UL (ref 4.5–6.2)
SODIUM SERPL-SCNC: 140 MMOL/L (ref 136–145)
TROPONIN I SERPL-MCNC: < 0.04 NG/ML (ref 0–0.06)
TROPONIN I SERPL-MCNC: < 0.04 NG/ML (ref 0–0.06)
WBC # BLD AUTO: 11.4 K/UL (ref 4.8–10.8)

## 2020-07-29 PROCEDURE — 5A09457 ASSISTANCE WITH RESPIRATORY VENTILATION, 24-96 CONSECUTIVE HOURS, CONTINUOUS POSITIVE AIRWAY PRESSURE: ICD-10-PCS | Performed by: INTERNAL MEDICINE

## 2020-07-29 RX ADMIN — GUAIFENESIN AND CODEINE PHOSPHATE PRN ML: 100; 10 SOLUTION ORAL at 11:46

## 2020-07-29 RX ADMIN — BENZONATATE SCH MG: 100 CAPSULE ORAL at 09:24

## 2020-07-29 RX ADMIN — Medication SCH MG: at 09:24

## 2020-07-29 RX ADMIN — ALBUTEROL SULFATE SCH INH: 90 AEROSOL, METERED RESPIRATORY (INHALATION) at 11:47

## 2020-07-29 RX ADMIN — SODIUM CHLORIDE SCH UNIT: 9 INJECTION, SOLUTION INTRAVENOUS at 21:00

## 2020-07-29 RX ADMIN — BENZONATATE SCH MG: 100 CAPSULE ORAL at 14:04

## 2020-07-29 RX ADMIN — SODIUM CHLORIDE SCH MLS/HR: 9 INJECTION, SOLUTION INTRAVENOUS at 18:30

## 2020-07-29 RX ADMIN — ALBUTEROL SULFATE SCH INH: 90 AEROSOL, METERED RESPIRATORY (INHALATION) at 17:42

## 2020-07-29 RX ADMIN — ACETAMINOPHEN AND CODEINE PHOSPHATE PRN TAB: 300; 30 TABLET ORAL at 01:29

## 2020-07-29 RX ADMIN — SIMVASTATIN SCH MG: 20 TABLET, FILM COATED ORAL at 21:06

## 2020-07-29 RX ADMIN — SUCRALFATE SCH GM: 1 TABLET ORAL at 21:05

## 2020-07-29 RX ADMIN — METHYLPREDNISOLONE SODIUM SUCCINATE SCH MG: 40 INJECTION, POWDER, FOR SOLUTION INTRAMUSCULAR; INTRAVENOUS at 14:05

## 2020-07-29 RX ADMIN — METHYLPREDNISOLONE SODIUM SUCCINATE SCH MG: 40 INJECTION, POWDER, FOR SOLUTION INTRAMUSCULAR; INTRAVENOUS at 09:24

## 2020-07-29 RX ADMIN — METHYLPREDNISOLONE SODIUM SUCCINATE SCH MG: 40 INJECTION, POWDER, FOR SOLUTION INTRAMUSCULAR; INTRAVENOUS at 21:07

## 2020-07-29 RX ADMIN — SUCRALFATE SCH GM: 1 TABLET ORAL at 06:08

## 2020-07-29 RX ADMIN — GUAIFENESIN AND CODEINE PHOSPHATE PRN ML: 100; 10 SOLUTION ORAL at 04:32

## 2020-07-29 RX ADMIN — Medication SCH MG: at 21:05

## 2020-07-29 RX ADMIN — ENOXAPARIN SODIUM SCH MG: 100 INJECTION SUBCUTANEOUS at 21:07

## 2020-07-29 RX ADMIN — ALBUTEROL SULFATE SCH INH: 90 AEROSOL, METERED RESPIRATORY (INHALATION) at 06:07

## 2020-07-29 RX ADMIN — SUCRALFATE SCH GM: 1 TABLET ORAL at 16:41

## 2020-07-29 RX ADMIN — ASPIRIN TAB DELAYED RELEASE 81 MG SCH MG: 81 TABLET DELAYED RESPONSE at 09:24

## 2020-07-29 RX ADMIN — SODIUM CHLORIDE SCH UNIT: 9 INJECTION, SOLUTION INTRAVENOUS at 16:42

## 2020-07-29 RX ADMIN — SUCRALFATE SCH GM: 1 TABLET ORAL at 11:46

## 2020-07-29 RX ADMIN — OXYCODONE HYDROCHLORIDE AND ACETAMINOPHEN SCH MG: 500 TABLET ORAL at 09:24

## 2020-07-29 RX ADMIN — DIAZEPAM PRN MG: 5 TABLET ORAL at 21:05

## 2020-07-29 RX ADMIN — SODIUM CHLORIDE SCH UNIT: 9 INJECTION, SOLUTION INTRAVENOUS at 11:47

## 2020-07-29 RX ADMIN — Medication SCH MG: at 21:06

## 2020-07-29 RX ADMIN — BENZONATATE SCH MG: 100 CAPSULE ORAL at 21:05

## 2020-07-29 RX ADMIN — SODIUM CHLORIDE SCH UNIT: 9 INJECTION, SOLUTION INTRAVENOUS at 06:12

## 2020-07-29 RX ADMIN — ENOXAPARIN SODIUM SCH MG: 100 INJECTION SUBCUTANEOUS at 11:46

## 2020-07-29 RX ADMIN — ALBUTEROL SULFATE SCH INH: 90 AEROSOL, METERED RESPIRATORY (INHALATION) at 23:27

## 2020-07-29 RX ADMIN — NITROGLYCERIN SCH INCH: 20 OINTMENT TOPICAL at 06:08

## 2020-07-29 RX ADMIN — PANTOPRAZOLE SODIUM SCH MG: 40 TABLET, DELAYED RELEASE ORAL at 09:24

## 2020-07-29 RX ADMIN — LOSARTAN POTASSIUM SCH MG: 50 TABLET, FILM COATED ORAL at 09:24

## 2020-07-30 VITALS — DIASTOLIC BLOOD PRESSURE: 60 MMHG | SYSTOLIC BLOOD PRESSURE: 115 MMHG

## 2020-07-30 VITALS — DIASTOLIC BLOOD PRESSURE: 68 MMHG | SYSTOLIC BLOOD PRESSURE: 108 MMHG

## 2020-07-30 VITALS — DIASTOLIC BLOOD PRESSURE: 72 MMHG | SYSTOLIC BLOOD PRESSURE: 107 MMHG

## 2020-07-30 VITALS — SYSTOLIC BLOOD PRESSURE: 105 MMHG | DIASTOLIC BLOOD PRESSURE: 61 MMHG

## 2020-07-30 VITALS — DIASTOLIC BLOOD PRESSURE: 69 MMHG | SYSTOLIC BLOOD PRESSURE: 126 MMHG

## 2020-07-30 VITALS — DIASTOLIC BLOOD PRESSURE: 64 MMHG | SYSTOLIC BLOOD PRESSURE: 121 MMHG

## 2020-07-30 LAB
BASE EXCESS BLDA CALC-SCNC: -0.1 MMOL/L (ref -2–3)
BASE EXCESS BLDA CALC-SCNC: -2.6 MMOL/L (ref -2–3)
BASOPHILS NFR BLD AUTO: 0.1 % (ref 0–5)
BUN SERPL-MCNC: 33 MG/DL (ref 7–18)
CHLORIDE SERPL-SCNC: 106 MMOL/L (ref 101–111)
CO2 SERPL-SCNC: 27 MMOL/L (ref 21–32)
CREAT SERPL-MCNC: 0.9 MG/DL (ref 0.5–1.5)
CRP SERPL HS-MCNC: 60 MG/L (ref 0–9)
EOSINOPHIL NFR BLD AUTO: 0 % (ref 0–8)
ERYTHROCYTE [DISTWIDTH] IN BLOOD BY AUTOMATED COUNT: 12.7 % (ref 11–15.5)
GFR SERPL CREATININE-BSD FRML MDRD: 89 ML/MIN (ref 60–?)
GLUCOSE SERPL-MCNC: 185 MG/DL (ref 70–105)
HCO3 BLDA-SCNC: 21 MMOL/L (ref 21–28)
HCO3 BLDA-SCNC: 23.7 MMOL/L (ref 21–28)
HCT VFR BLD AUTO: 39.8 % (ref 42–54)
LDH SERPL-CCNC: 508 U/L (ref 81–234)
LYMPHOCYTES NFR SPEC AUTO: 2.2 % (ref 21–51)
MCH RBC QN AUTO: 30.7 PG (ref 27–33)
MCHC RBC AUTO-ENTMCNC: 33.7 G/DL (ref 32–36)
MCV RBC AUTO: 91.1 FL (ref 79–99)
MONOCYTES NFR BLD AUTO: 1.2 % (ref 3–13)
NEUTROPHILS NFR BLD AUTO: 95.9 % (ref 40–77)
NRBC BLD MANUAL-RTO: 0 % (ref 0–0.19)
PCO2 BLDA: 33 MMHG (ref 35–48)
PCO2 BLDA: 37 MMHG (ref 35–48)
PLATELET # BLD AUTO: 148 K/UL (ref 130–400)
POTASSIUM SERPL-SCNC: 4.6 MMOL/L (ref 3.5–5.1)
RBC # BLD AUTO: 4.37 MIL/UL (ref 4.5–6.2)
SAO2 % BLDA: 71.5 % (ref 95–99)
SAO2 % BLDA: 90.4 % (ref 95–99)
SODIUM SERPL-SCNC: 139 MMOL/L (ref 136–145)
WBC # BLD AUTO: 12.5 K/UL (ref 4.8–10.8)

## 2020-07-30 RX ADMIN — SODIUM CHLORIDE SCH UNIT: 9 INJECTION, SOLUTION INTRAVENOUS at 12:18

## 2020-07-30 RX ADMIN — METHYLPREDNISOLONE SODIUM SUCCINATE SCH MG: 40 INJECTION, POWDER, FOR SOLUTION INTRAMUSCULAR; INTRAVENOUS at 08:24

## 2020-07-30 RX ADMIN — GUAIFENESIN AND CODEINE PHOSPHATE PRN ML: 100; 10 SOLUTION ORAL at 02:11

## 2020-07-30 RX ADMIN — Medication SCH MG: at 20:02

## 2020-07-30 RX ADMIN — SUCRALFATE SCH GM: 1 TABLET ORAL at 20:01

## 2020-07-30 RX ADMIN — Medication SCH MG: at 08:24

## 2020-07-30 RX ADMIN — ACETAMINOPHEN PRN MG: 325 TABLET, FILM COATED ORAL at 10:51

## 2020-07-30 RX ADMIN — SODIUM CHLORIDE SCH UNIT: 9 INJECTION, SOLUTION INTRAVENOUS at 16:26

## 2020-07-30 RX ADMIN — BENZONATATE SCH MG: 100 CAPSULE ORAL at 14:11

## 2020-07-30 RX ADMIN — SODIUM CHLORIDE SCH UNIT: 9 INJECTION, SOLUTION INTRAVENOUS at 20:05

## 2020-07-30 RX ADMIN — SUCRALFATE SCH GM: 1 TABLET ORAL at 06:24

## 2020-07-30 RX ADMIN — BENZONATATE SCH MG: 100 CAPSULE ORAL at 08:23

## 2020-07-30 RX ADMIN — ASPIRIN TAB DELAYED RELEASE 81 MG SCH MG: 81 TABLET DELAYED RESPONSE at 08:24

## 2020-07-30 RX ADMIN — ENOXAPARIN SODIUM SCH MG: 100 INJECTION SUBCUTANEOUS at 08:23

## 2020-07-30 RX ADMIN — PANTOPRAZOLE SODIUM SCH MG: 40 TABLET, DELAYED RELEASE ORAL at 08:24

## 2020-07-30 RX ADMIN — OXYCODONE HYDROCHLORIDE AND ACETAMINOPHEN SCH MG: 500 TABLET ORAL at 08:24

## 2020-07-30 RX ADMIN — ACETAMINOPHEN AND CODEINE PHOSPHATE PRN TAB: 300; 30 TABLET ORAL at 17:09

## 2020-07-30 RX ADMIN — SUCRALFATE SCH GM: 1 TABLET ORAL at 12:17

## 2020-07-30 RX ADMIN — Medication SCH MG: at 20:01

## 2020-07-30 RX ADMIN — BISACODYL PRN MG: 10 SUPPOSITORY RECTAL at 08:27

## 2020-07-30 RX ADMIN — ALBUTEROL SULFATE SCH INH: 90 AEROSOL, METERED RESPIRATORY (INHALATION) at 06:23

## 2020-07-30 RX ADMIN — ALBUTEROL SULFATE SCH INH: 90 AEROSOL, METERED RESPIRATORY (INHALATION) at 23:14

## 2020-07-30 RX ADMIN — LOSARTAN POTASSIUM SCH MG: 50 TABLET, FILM COATED ORAL at 08:23

## 2020-07-30 RX ADMIN — ENOXAPARIN SODIUM SCH MG: 100 INJECTION SUBCUTANEOUS at 20:03

## 2020-07-30 RX ADMIN — ALBUTEROL SULFATE SCH INH: 90 AEROSOL, METERED RESPIRATORY (INHALATION) at 17:09

## 2020-07-30 RX ADMIN — DIAZEPAM PRN MG: 5 TABLET ORAL at 20:02

## 2020-07-30 RX ADMIN — BENZONATATE SCH MG: 100 CAPSULE ORAL at 20:19

## 2020-07-30 RX ADMIN — METHYLPREDNISOLONE SODIUM SUCCINATE SCH MG: 40 INJECTION, POWDER, FOR SOLUTION INTRAMUSCULAR; INTRAVENOUS at 14:11

## 2020-07-30 RX ADMIN — SODIUM CHLORIDE SCH UNIT: 9 INJECTION, SOLUTION INTRAVENOUS at 07:27

## 2020-07-30 RX ADMIN — SIMVASTATIN SCH MG: 20 TABLET, FILM COATED ORAL at 20:01

## 2020-07-30 RX ADMIN — SUCRALFATE SCH GM: 1 TABLET ORAL at 16:20

## 2020-07-30 RX ADMIN — METHYLPREDNISOLONE SODIUM SUCCINATE SCH MG: 40 INJECTION, POWDER, FOR SOLUTION INTRAMUSCULAR; INTRAVENOUS at 20:01

## 2020-07-30 RX ADMIN — ALBUTEROL SULFATE SCH INH: 90 AEROSOL, METERED RESPIRATORY (INHALATION) at 12:19

## 2020-07-31 VITALS — SYSTOLIC BLOOD PRESSURE: 127 MMHG | DIASTOLIC BLOOD PRESSURE: 69 MMHG

## 2020-07-31 VITALS — SYSTOLIC BLOOD PRESSURE: 109 MMHG | DIASTOLIC BLOOD PRESSURE: 67 MMHG

## 2020-07-31 VITALS — DIASTOLIC BLOOD PRESSURE: 72 MMHG | SYSTOLIC BLOOD PRESSURE: 134 MMHG

## 2020-07-31 VITALS — SYSTOLIC BLOOD PRESSURE: 111 MMHG | DIASTOLIC BLOOD PRESSURE: 68 MMHG

## 2020-07-31 VITALS — SYSTOLIC BLOOD PRESSURE: 131 MMHG | DIASTOLIC BLOOD PRESSURE: 74 MMHG

## 2020-07-31 VITALS — SYSTOLIC BLOOD PRESSURE: 110 MMHG | DIASTOLIC BLOOD PRESSURE: 63 MMHG

## 2020-07-31 LAB
BASOPHILS NFR BLD AUTO: 0.1 % (ref 0–5)
BUN SERPL-MCNC: 33 MG/DL (ref 7–18)
CHLORIDE SERPL-SCNC: 104 MMOL/L (ref 101–111)
CO2 SERPL-SCNC: 27 MMOL/L (ref 21–32)
CREAT SERPL-MCNC: 0.9 MG/DL (ref 0.5–1.5)
CRP SERPL HS-MCNC: 25.2 MG/L (ref 0–9)
EOSINOPHIL NFR BLD AUTO: 0 % (ref 0–8)
ERYTHROCYTE [DISTWIDTH] IN BLOOD BY AUTOMATED COUNT: 12.8 % (ref 11–15.5)
GFR SERPL CREATININE-BSD FRML MDRD: 89 ML/MIN (ref 60–?)
GLUCOSE SERPL-MCNC: 164 MG/DL (ref 70–105)
HCT VFR BLD AUTO: 38.8 % (ref 42–54)
LDH SERPL-CCNC: 458 U/L (ref 81–234)
LYMPHOCYTES NFR SPEC AUTO: 2.2 % (ref 21–51)
MCH RBC QN AUTO: 30.6 PG (ref 27–33)
MCHC RBC AUTO-ENTMCNC: 33.2 G/DL (ref 32–36)
MCV RBC AUTO: 91.9 FL (ref 79–99)
MONOCYTES NFR BLD AUTO: 1.4 % (ref 3–13)
NEUTROPHILS NFR BLD AUTO: 95.7 % (ref 40–77)
NRBC BLD MANUAL-RTO: 0 % (ref 0–0.19)
PLATELET # BLD AUTO: 132 K/UL (ref 130–400)
POTASSIUM SERPL-SCNC: 4.3 MMOL/L (ref 3.5–5.1)
RBC # BLD AUTO: 4.22 MIL/UL (ref 4.5–6.2)
SODIUM SERPL-SCNC: 137 MMOL/L (ref 136–145)
WBC # BLD AUTO: 11.5 K/UL (ref 4.8–10.8)

## 2020-07-31 RX ADMIN — SUCRALFATE SCH GM: 1 TABLET ORAL at 20:35

## 2020-07-31 RX ADMIN — LOSARTAN POTASSIUM SCH MG: 50 TABLET, FILM COATED ORAL at 08:28

## 2020-07-31 RX ADMIN — METHYLPREDNISOLONE SODIUM SUCCINATE SCH MG: 40 INJECTION, POWDER, FOR SOLUTION INTRAMUSCULAR; INTRAVENOUS at 08:28

## 2020-07-31 RX ADMIN — BENZONATATE SCH MG: 100 CAPSULE ORAL at 20:35

## 2020-07-31 RX ADMIN — Medication SCH MG: at 08:28

## 2020-07-31 RX ADMIN — ALBUTEROL SULFATE SCH INH: 90 AEROSOL, METERED RESPIRATORY (INHALATION) at 05:59

## 2020-07-31 RX ADMIN — ACETAMINOPHEN PRN MG: 325 TABLET, FILM COATED ORAL at 00:41

## 2020-07-31 RX ADMIN — GUAIFENESIN AND CODEINE PHOSPHATE PRN ML: 100; 10 SOLUTION ORAL at 06:40

## 2020-07-31 RX ADMIN — HYDROXYZINE HYDROCHLORIDE PRN MG: 25 TABLET, FILM COATED ORAL at 12:07

## 2020-07-31 RX ADMIN — SUCRALFATE SCH GM: 1 TABLET ORAL at 16:52

## 2020-07-31 RX ADMIN — ASPIRIN TAB DELAYED RELEASE 81 MG SCH MG: 81 TABLET DELAYED RESPONSE at 08:28

## 2020-07-31 RX ADMIN — ACETAMINOPHEN AND CODEINE PHOSPHATE PRN TAB: 300; 30 TABLET ORAL at 22:21

## 2020-07-31 RX ADMIN — SODIUM CHLORIDE SCH UNIT: 9 INJECTION, SOLUTION INTRAVENOUS at 16:16

## 2020-07-31 RX ADMIN — METHYLPREDNISOLONE SODIUM SUCCINATE SCH MG: 40 INJECTION, POWDER, FOR SOLUTION INTRAMUSCULAR; INTRAVENOUS at 20:35

## 2020-07-31 RX ADMIN — BENZONATATE SCH MG: 100 CAPSULE ORAL at 13:15

## 2020-07-31 RX ADMIN — SUCRALFATE SCH GM: 1 TABLET ORAL at 12:07

## 2020-07-31 RX ADMIN — SODIUM CHLORIDE SCH UNIT: 9 INJECTION, SOLUTION INTRAVENOUS at 20:38

## 2020-07-31 RX ADMIN — METHYLPREDNISOLONE SODIUM SUCCINATE SCH MG: 40 INJECTION, POWDER, FOR SOLUTION INTRAMUSCULAR; INTRAVENOUS at 13:15

## 2020-07-31 RX ADMIN — OXYCODONE HYDROCHLORIDE AND ACETAMINOPHEN SCH MG: 500 TABLET ORAL at 08:28

## 2020-07-31 RX ADMIN — BENZONATATE SCH MG: 100 CAPSULE ORAL at 08:28

## 2020-07-31 RX ADMIN — Medication SCH GM: at 08:29

## 2020-07-31 RX ADMIN — ALBUTEROL SULFATE SCH INH: 90 AEROSOL, METERED RESPIRATORY (INHALATION) at 17:59

## 2020-07-31 RX ADMIN — Medication SCH MG: at 20:35

## 2020-07-31 RX ADMIN — SODIUM CHLORIDE SCH UNIT: 9 INJECTION, SOLUTION INTRAVENOUS at 12:08

## 2020-07-31 RX ADMIN — DIAZEPAM PRN MG: 5 TABLET ORAL at 20:36

## 2020-07-31 RX ADMIN — SIMVASTATIN SCH MG: 20 TABLET, FILM COATED ORAL at 20:35

## 2020-07-31 RX ADMIN — SODIUM CHLORIDE SCH UNIT: 9 INJECTION, SOLUTION INTRAVENOUS at 05:02

## 2020-07-31 RX ADMIN — ACETAMINOPHEN AND CODEINE PHOSPHATE PRN TAB: 300; 30 TABLET ORAL at 14:55

## 2020-07-31 RX ADMIN — ALBUTEROL SULFATE SCH INH: 90 AEROSOL, METERED RESPIRATORY (INHALATION) at 12:08

## 2020-07-31 RX ADMIN — SUCRALFATE SCH GM: 1 TABLET ORAL at 05:59

## 2020-07-31 RX ADMIN — ENOXAPARIN SODIUM SCH MG: 100 INJECTION SUBCUTANEOUS at 08:30

## 2020-07-31 RX ADMIN — ENOXAPARIN SODIUM SCH MG: 100 INJECTION SUBCUTANEOUS at 20:36

## 2020-07-31 RX ADMIN — PANTOPRAZOLE SODIUM SCH MG: 40 TABLET, DELAYED RELEASE ORAL at 08:28

## 2020-08-01 VITALS — SYSTOLIC BLOOD PRESSURE: 119 MMHG | DIASTOLIC BLOOD PRESSURE: 72 MMHG

## 2020-08-01 VITALS — DIASTOLIC BLOOD PRESSURE: 72 MMHG | SYSTOLIC BLOOD PRESSURE: 118 MMHG

## 2020-08-01 VITALS — DIASTOLIC BLOOD PRESSURE: 81 MMHG | SYSTOLIC BLOOD PRESSURE: 126 MMHG

## 2020-08-01 VITALS — SYSTOLIC BLOOD PRESSURE: 102 MMHG | DIASTOLIC BLOOD PRESSURE: 65 MMHG

## 2020-08-01 VITALS — DIASTOLIC BLOOD PRESSURE: 74 MMHG | SYSTOLIC BLOOD PRESSURE: 112 MMHG

## 2020-08-01 LAB
BASOPHILS NFR BLD AUTO: 0.1 % (ref 0–5)
BUN SERPL-MCNC: 30 MG/DL (ref 7–18)
CHLORIDE SERPL-SCNC: 104 MMOL/L (ref 101–111)
CO2 SERPL-SCNC: 26 MMOL/L (ref 21–32)
CREAT SERPL-MCNC: 0.9 MG/DL (ref 0.5–1.5)
EOSINOPHIL NFR BLD AUTO: 0 % (ref 0–8)
ERYTHROCYTE [DISTWIDTH] IN BLOOD BY AUTOMATED COUNT: 12.7 % (ref 11–15.5)
GFR SERPL CREATININE-BSD FRML MDRD: 89 ML/MIN (ref 60–?)
GLUCOSE SERPL-MCNC: 182 MG/DL (ref 70–105)
HCT VFR BLD AUTO: 37.1 % (ref 42–54)
LYMPHOCYTES NFR SPEC AUTO: 2.3 % (ref 21–51)
MCH RBC QN AUTO: 31.1 PG (ref 27–33)
MCHC RBC AUTO-ENTMCNC: 34.5 G/DL (ref 32–36)
MCV RBC AUTO: 90.3 FL (ref 79–99)
MONOCYTES NFR BLD AUTO: 1.7 % (ref 3–13)
NEUTROPHILS NFR BLD AUTO: 94.9 % (ref 40–77)
NRBC BLD MANUAL-RTO: 0 % (ref 0–0.19)
PLATELET # BLD AUTO: 134 K/UL (ref 130–400)
POTASSIUM SERPL-SCNC: 4.6 MMOL/L (ref 3.5–5.1)
RBC # BLD AUTO: 4.11 MIL/UL (ref 4.5–6.2)
SODIUM SERPL-SCNC: 137 MMOL/L (ref 136–145)
WBC # BLD AUTO: 10.1 K/UL (ref 4.8–10.8)

## 2020-08-01 PROCEDURE — 5A09357 ASSISTANCE WITH RESPIRATORY VENTILATION, LESS THAN 24 CONSECUTIVE HOURS, CONTINUOUS POSITIVE AIRWAY PRESSURE: ICD-10-PCS | Performed by: INTERNAL MEDICINE

## 2020-08-01 RX ADMIN — Medication SCH MG: at 20:42

## 2020-08-01 RX ADMIN — SUCRALFATE SCH GM: 1 TABLET ORAL at 16:56

## 2020-08-01 RX ADMIN — Medication SCH MG: at 09:39

## 2020-08-01 RX ADMIN — FUROSEMIDE SCH MG: 10 INJECTION INTRAVENOUS at 20:43

## 2020-08-01 RX ADMIN — BENZONATATE SCH MG: 100 CAPSULE ORAL at 15:24

## 2020-08-01 RX ADMIN — PANTOPRAZOLE SODIUM SCH MG: 40 TABLET, DELAYED RELEASE ORAL at 09:40

## 2020-08-01 RX ADMIN — SODIUM CHLORIDE SCH UNIT: 9 INJECTION, SOLUTION INTRAVENOUS at 11:30

## 2020-08-01 RX ADMIN — FUROSEMIDE SCH MG: 10 INJECTION INTRAVENOUS at 11:15

## 2020-08-01 RX ADMIN — SODIUM CHLORIDE SCH UNIT: 9 INJECTION, SOLUTION INTRAVENOUS at 21:38

## 2020-08-01 RX ADMIN — SODIUM CHLORIDE SCH UNIT: 9 INJECTION, SOLUTION INTRAVENOUS at 06:39

## 2020-08-01 RX ADMIN — BENZONATATE SCH MG: 100 CAPSULE ORAL at 09:40

## 2020-08-01 RX ADMIN — HYDROXYZINE HYDROCHLORIDE PRN MG: 25 TABLET, FILM COATED ORAL at 00:08

## 2020-08-01 RX ADMIN — ASPIRIN TAB DELAYED RELEASE 81 MG SCH MG: 81 TABLET DELAYED RESPONSE at 09:40

## 2020-08-01 RX ADMIN — SUCRALFATE SCH GM: 1 TABLET ORAL at 20:42

## 2020-08-01 RX ADMIN — METHYLPREDNISOLONE SODIUM SUCCINATE SCH MG: 40 INJECTION, POWDER, FOR SOLUTION INTRAMUSCULAR; INTRAVENOUS at 15:24

## 2020-08-01 RX ADMIN — SIMVASTATIN SCH MG: 20 TABLET, FILM COATED ORAL at 20:42

## 2020-08-01 RX ADMIN — Medication SCH GM: at 09:39

## 2020-08-01 RX ADMIN — ALBUTEROL SULFATE SCH INH: 90 AEROSOL, METERED RESPIRATORY (INHALATION) at 05:19

## 2020-08-01 RX ADMIN — METHYLPREDNISOLONE SODIUM SUCCINATE SCH MG: 40 INJECTION, POWDER, FOR SOLUTION INTRAMUSCULAR; INTRAVENOUS at 20:42

## 2020-08-01 RX ADMIN — GUAIFENESIN AND CODEINE PHOSPHATE SCH ML: 100; 10 SOLUTION ORAL at 23:32

## 2020-08-01 RX ADMIN — HYDROXYZINE HYDROCHLORIDE PRN MG: 25 TABLET, FILM COATED ORAL at 20:42

## 2020-08-01 RX ADMIN — BENZONATATE SCH MG: 100 CAPSULE ORAL at 20:42

## 2020-08-01 RX ADMIN — ALBUTEROL SULFATE SCH INH: 90 AEROSOL, METERED RESPIRATORY (INHALATION) at 00:07

## 2020-08-01 RX ADMIN — GUAIFENESIN AND CODEINE PHOSPHATE SCH ML: 100; 10 SOLUTION ORAL at 16:59

## 2020-08-01 RX ADMIN — ACETAMINOPHEN AND CODEINE PHOSPHATE PRN TAB: 300; 30 TABLET ORAL at 18:38

## 2020-08-01 RX ADMIN — ENOXAPARIN SODIUM SCH MG: 100 INJECTION SUBCUTANEOUS at 09:39

## 2020-08-01 RX ADMIN — SUCRALFATE SCH GM: 1 TABLET ORAL at 11:30

## 2020-08-01 RX ADMIN — SUCRALFATE SCH GM: 1 TABLET ORAL at 06:39

## 2020-08-01 RX ADMIN — OXYCODONE HYDROCHLORIDE AND ACETAMINOPHEN SCH MG: 500 TABLET ORAL at 09:40

## 2020-08-01 RX ADMIN — GUAIFENESIN AND CODEINE PHOSPHATE SCH ML: 100; 10 SOLUTION ORAL at 11:15

## 2020-08-01 RX ADMIN — GUAIFENESIN AND CODEINE PHOSPHATE PRN ML: 100; 10 SOLUTION ORAL at 09:39

## 2020-08-01 RX ADMIN — LOSARTAN POTASSIUM SCH MG: 50 TABLET, FILM COATED ORAL at 09:40

## 2020-08-01 RX ADMIN — METHYLPREDNISOLONE SODIUM SUCCINATE SCH MG: 40 INJECTION, POWDER, FOR SOLUTION INTRAMUSCULAR; INTRAVENOUS at 09:41

## 2020-08-01 RX ADMIN — ENOXAPARIN SODIUM SCH MG: 100 INJECTION SUBCUTANEOUS at 20:44

## 2020-08-01 RX ADMIN — DIAZEPAM PRN MG: 5 TABLET ORAL at 15:25

## 2020-08-01 RX ADMIN — SODIUM CHLORIDE SCH UNIT: 9 INJECTION, SOLUTION INTRAVENOUS at 16:58

## 2020-08-01 NOTE — NUR
ANXIETY

PT C/O ANXIETY FREQUENTLY TONIGHT ALTHOUGH HE IS RESTING CALMLY IN THE BED. HAS ALREADY 
RECEIVED VALIUM, ATARAX ADMINISTERED AND PT ENCOURAGED TO UTILIZE ALTERNATIVE MEANS TO COPE 
WITH ANXIETY SUCH AS MUSIC AND MEDITATION.

## 2020-08-02 VITALS — SYSTOLIC BLOOD PRESSURE: 119 MMHG | DIASTOLIC BLOOD PRESSURE: 79 MMHG

## 2020-08-02 VITALS — DIASTOLIC BLOOD PRESSURE: 74 MMHG | SYSTOLIC BLOOD PRESSURE: 120 MMHG

## 2020-08-02 VITALS — DIASTOLIC BLOOD PRESSURE: 73 MMHG | SYSTOLIC BLOOD PRESSURE: 123 MMHG

## 2020-08-02 VITALS — DIASTOLIC BLOOD PRESSURE: 55 MMHG | SYSTOLIC BLOOD PRESSURE: 94 MMHG

## 2020-08-02 VITALS — SYSTOLIC BLOOD PRESSURE: 108 MMHG | DIASTOLIC BLOOD PRESSURE: 63 MMHG

## 2020-08-02 VITALS — DIASTOLIC BLOOD PRESSURE: 59 MMHG | SYSTOLIC BLOOD PRESSURE: 97 MMHG

## 2020-08-02 VITALS — SYSTOLIC BLOOD PRESSURE: 110 MMHG | DIASTOLIC BLOOD PRESSURE: 63 MMHG

## 2020-08-02 LAB
ALBUMIN SERPL-MCNC: 2.1 G/DL (ref 3.5–5)
ALT SERPL-CCNC: 31 U/L (ref 12–78)
AST SERPL-CCNC: 23 U/L (ref 10–37)
BASOPHILS NFR BLD AUTO: 0.2 % (ref 0–5)
BILIRUB SERPL-MCNC: 0.5 MG/DL (ref 0.2–1)
BUN SERPL-MCNC: 34 MG/DL (ref 7–18)
CHLORIDE SERPL-SCNC: 102 MMOL/L (ref 101–111)
CO2 SERPL-SCNC: 27 MMOL/L (ref 21–32)
CREAT SERPL-MCNC: 0.9 MG/DL (ref 0.5–1.5)
CRP SERPL HS-MCNC: 11.7 MG/L (ref 0–9)
EOSINOPHIL NFR BLD AUTO: 0 % (ref 0–8)
ERYTHROCYTE [DISTWIDTH] IN BLOOD BY AUTOMATED COUNT: 12.6 % (ref 11–15.5)
GFR SERPL CREATININE-BSD FRML MDRD: 89 ML/MIN (ref 60–?)
GLUCOSE SERPL-MCNC: 145 MG/DL (ref 70–105)
HCT VFR BLD AUTO: 38.6 % (ref 42–54)
LDH SERPL-CCNC: 507 U/L (ref 81–234)
LYMPHOCYTES NFR SPEC AUTO: 3.2 % (ref 21–51)
MCH RBC QN AUTO: 31.2 PG (ref 27–33)
MCHC RBC AUTO-ENTMCNC: 34.7 G/DL (ref 32–36)
MCV RBC AUTO: 89.8 FL (ref 79–99)
MONOCYTES NFR BLD AUTO: 1.3 % (ref 3–13)
NEUTROPHILS NFR BLD AUTO: 93.8 % (ref 40–77)
NRBC BLD MANUAL-RTO: 0 % (ref 0–0.19)
PLATELET # BLD AUTO: 149 K/UL (ref 130–400)
POTASSIUM SERPL-SCNC: 4.5 MMOL/L (ref 3.5–5.1)
PROT SERPL-MCNC: 5.7 G/DL (ref 6–8.3)
RBC # BLD AUTO: 4.3 MIL/UL (ref 4.5–6.2)
SODIUM SERPL-SCNC: 136 MMOL/L (ref 136–145)
WBC # BLD AUTO: 12.2 K/UL (ref 4.8–10.8)

## 2020-08-02 RX ADMIN — ENOXAPARIN SODIUM SCH MG: 100 INJECTION SUBCUTANEOUS at 09:52

## 2020-08-02 RX ADMIN — Medication SCH MG: at 19:50

## 2020-08-02 RX ADMIN — METHYLPREDNISOLONE SODIUM SUCCINATE SCH MG: 40 INJECTION, POWDER, FOR SOLUTION INTRAMUSCULAR; INTRAVENOUS at 09:50

## 2020-08-02 RX ADMIN — SUCRALFATE SCH GM: 1 TABLET ORAL at 19:50

## 2020-08-02 RX ADMIN — DIAZEPAM PRN MG: 5 TABLET ORAL at 10:27

## 2020-08-02 RX ADMIN — PANTOPRAZOLE SODIUM SCH MG: 40 TABLET, DELAYED RELEASE ORAL at 09:53

## 2020-08-02 RX ADMIN — METHYLPREDNISOLONE SODIUM SUCCINATE SCH MG: 40 INJECTION, POWDER, FOR SOLUTION INTRAMUSCULAR; INTRAVENOUS at 19:49

## 2020-08-02 RX ADMIN — GUAIFENESIN AND CODEINE PHOSPHATE SCH ML: 100; 10 SOLUTION ORAL at 05:47

## 2020-08-02 RX ADMIN — Medication SCH MG: at 19:48

## 2020-08-02 RX ADMIN — BENZONATATE SCH MG: 100 CAPSULE ORAL at 15:16

## 2020-08-02 RX ADMIN — FUROSEMIDE SCH MG: 10 INJECTION INTRAVENOUS at 09:51

## 2020-08-02 RX ADMIN — BENZONATATE SCH MG: 100 CAPSULE ORAL at 10:26

## 2020-08-02 RX ADMIN — ENOXAPARIN SODIUM SCH MG: 100 INJECTION SUBCUTANEOUS at 19:50

## 2020-08-02 RX ADMIN — SIMVASTATIN SCH MG: 20 TABLET, FILM COATED ORAL at 19:50

## 2020-08-02 RX ADMIN — SODIUM CHLORIDE SCH UNIT: 9 INJECTION, SOLUTION INTRAVENOUS at 11:30

## 2020-08-02 RX ADMIN — SUCRALFATE SCH GM: 1 TABLET ORAL at 10:27

## 2020-08-02 RX ADMIN — SUCRALFATE SCH GM: 1 TABLET ORAL at 05:47

## 2020-08-02 RX ADMIN — GUAIFENESIN AND CODEINE PHOSPHATE SCH ML: 100; 10 SOLUTION ORAL at 23:08

## 2020-08-02 RX ADMIN — SODIUM CHLORIDE SCH UNIT: 9 INJECTION, SOLUTION INTRAVENOUS at 05:48

## 2020-08-02 RX ADMIN — GUAIFENESIN AND CODEINE PHOSPHATE SCH ML: 100; 10 SOLUTION ORAL at 11:47

## 2020-08-02 RX ADMIN — OXYCODONE HYDROCHLORIDE AND ACETAMINOPHEN SCH MG: 500 TABLET ORAL at 09:52

## 2020-08-02 RX ADMIN — METHYLPREDNISOLONE SODIUM SUCCINATE SCH MG: 40 INJECTION, POWDER, FOR SOLUTION INTRAMUSCULAR; INTRAVENOUS at 15:15

## 2020-08-02 RX ADMIN — BENZONATATE SCH MG: 100 CAPSULE ORAL at 19:49

## 2020-08-02 RX ADMIN — SUCRALFATE SCH GM: 1 TABLET ORAL at 15:15

## 2020-08-02 RX ADMIN — SODIUM CHLORIDE SCH UNIT: 9 INJECTION, SOLUTION INTRAVENOUS at 17:01

## 2020-08-02 RX ADMIN — LOSARTAN POTASSIUM SCH MG: 50 TABLET, FILM COATED ORAL at 09:53

## 2020-08-02 RX ADMIN — SODIUM CHLORIDE SCH UNIT: 9 INJECTION, SOLUTION INTRAVENOUS at 21:00

## 2020-08-02 RX ADMIN — FUROSEMIDE SCH MG: 10 INJECTION INTRAVENOUS at 19:49

## 2020-08-02 RX ADMIN — GUAIFENESIN AND CODEINE PHOSPHATE SCH ML: 100; 10 SOLUTION ORAL at 17:00

## 2020-08-02 RX ADMIN — Medication SCH MG: at 09:53

## 2020-08-02 RX ADMIN — Medication SCH GM: at 09:52

## 2020-08-02 RX ADMIN — ASPIRIN TAB DELAYED RELEASE 81 MG SCH MG: 81 TABLET DELAYED RESPONSE at 09:53

## 2020-08-02 RX ADMIN — ACETAMINOPHEN PRN MG: 325 TABLET, FILM COATED ORAL at 02:41

## 2020-08-02 NOTE — NUR
HIGH FLOW

PT SWITCHED FROM BIPAP 80%, SPO2 AROUND 93-94% AT REST; TO HIFLOW NC. WILL START % 
FIO2. AND WEAN AS TOLERATED.

## 2020-08-02 NOTE — NUR
SHIFT NOTE

PT IS TOLERATING HIFLOW NC AT 50L.  SATS REMAIN ABOVE 90%.  PT HAS GOOD APPETITE, VOIDING. 
SPOKE WITH DAUGHTER AND UPDATED ON CURRENT CONDITION.  PT WAS BATHED AND BARRIER CREAM 
APPLIED TO BUTTOCK AREA AND ALLEVYN FOAM DRESSING APPLIED TO COCCYX PREVENTATIVE.

## 2020-08-03 VITALS — SYSTOLIC BLOOD PRESSURE: 68 MMHG | DIASTOLIC BLOOD PRESSURE: 38 MMHG

## 2020-08-03 VITALS — SYSTOLIC BLOOD PRESSURE: 77 MMHG | DIASTOLIC BLOOD PRESSURE: 44 MMHG

## 2020-08-03 VITALS — DIASTOLIC BLOOD PRESSURE: 52 MMHG | SYSTOLIC BLOOD PRESSURE: 119 MMHG

## 2020-08-03 VITALS — SYSTOLIC BLOOD PRESSURE: 106 MMHG | DIASTOLIC BLOOD PRESSURE: 60 MMHG

## 2020-08-03 VITALS — SYSTOLIC BLOOD PRESSURE: 98 MMHG | DIASTOLIC BLOOD PRESSURE: 82 MMHG

## 2020-08-03 VITALS — DIASTOLIC BLOOD PRESSURE: 44 MMHG | SYSTOLIC BLOOD PRESSURE: 117 MMHG

## 2020-08-03 VITALS — DIASTOLIC BLOOD PRESSURE: 51 MMHG | SYSTOLIC BLOOD PRESSURE: 118 MMHG

## 2020-08-03 VITALS — SYSTOLIC BLOOD PRESSURE: 71 MMHG | DIASTOLIC BLOOD PRESSURE: 51 MMHG

## 2020-08-03 VITALS — SYSTOLIC BLOOD PRESSURE: 118 MMHG | DIASTOLIC BLOOD PRESSURE: 51 MMHG

## 2020-08-03 VITALS — SYSTOLIC BLOOD PRESSURE: 65 MMHG | DIASTOLIC BLOOD PRESSURE: 43 MMHG

## 2020-08-03 VITALS — DIASTOLIC BLOOD PRESSURE: 38 MMHG | SYSTOLIC BLOOD PRESSURE: 77 MMHG

## 2020-08-03 VITALS — DIASTOLIC BLOOD PRESSURE: 68 MMHG | SYSTOLIC BLOOD PRESSURE: 101 MMHG

## 2020-08-03 VITALS — SYSTOLIC BLOOD PRESSURE: 70 MMHG | DIASTOLIC BLOOD PRESSURE: 48 MMHG

## 2020-08-03 VITALS — SYSTOLIC BLOOD PRESSURE: 79 MMHG | DIASTOLIC BLOOD PRESSURE: 52 MMHG

## 2020-08-03 LAB
ALBUMIN SERPL-MCNC: 2.1 G/DL (ref 3.5–5)
ALT SERPL-CCNC: 37 U/L (ref 12–78)
AST SERPL-CCNC: 26 U/L (ref 10–37)
BASE EXCESS BLDA CALC-SCNC: -20.5 MMOL/L (ref -2–3)
BASE EXCESS BLDA CALC-SCNC: -7.6 MMOL/L (ref -2–3)
BASOPHILS NFR BLD AUTO: 0.2 % (ref 0–5)
BILIRUB SERPL-MCNC: 0.7 MG/DL (ref 0.2–1)
BUN SERPL-MCNC: 48 MG/DL (ref 7–18)
CHLORIDE SERPL-SCNC: 97 MMOL/L (ref 101–111)
CO2 SERPL-SCNC: 27 MMOL/L (ref 21–32)
CREAT SERPL-MCNC: 2 MG/DL (ref 0.5–1.5)
CRP SERPL HS-MCNC: 28.6 MG/L (ref 0–9)
EOSINOPHIL NFR BLD AUTO: 0.1 % (ref 0–8)
ERYTHROCYTE [DISTWIDTH] IN BLOOD BY AUTOMATED COUNT: 12.8 % (ref 11–15.5)
GFR SERPL CREATININE-BSD FRML MDRD: 35 ML/MIN (ref 60–?)
GLUCOSE SERPL-MCNC: 199 MG/DL (ref 70–105)
HCO3 BLDA-SCNC: 19.3 MMOL/L (ref 21–28)
HCO3 BLDA-SCNC: 9.7 MMOL/L (ref 21–28)
HCT VFR BLD AUTO: 35.8 % (ref 42–54)
LDH SERPL-CCNC: 466 U/L (ref 81–234)
LYMPHOCYTES NFR BLD MANUAL: 1 % (ref 22–44)
LYMPHOCYTES NFR SPEC AUTO: 0.8 % (ref 21–51)
MANUAL DIF COMMENT BLD-IMP: (no result)
MCH RBC QN AUTO: 30.9 PG (ref 27–33)
MCHC RBC AUTO-ENTMCNC: 34.4 G/DL (ref 32–36)
MCV RBC AUTO: 89.9 FL (ref 79–99)
MONOCYTES NFR BLD AUTO: 1.5 % (ref 3–13)
MONOCYTES NFR BLD MANUAL: 3 % (ref 2–9)
NEUTROPHILS NFR BLD AUTO: 96.1 % (ref 40–77)
NEUTS BAND NFR BLD MANUAL: 5 % (ref 0–2)
NEUTS SEG NFR BLD MANUAL: 91 % (ref 40–70)
NRBC BLD MANUAL-RTO: 0.1 % (ref 0–0.19)
PCO2 BLDA: 39 MMHG (ref 35–48)
PCO2 BLDA: 46 MMHG (ref 35–48)
PLAT MORPH BLD: (no result)
PLATELET # BLD AUTO: 145 K/UL (ref 130–400)
POTASSIUM SERPL-SCNC: 4.8 MMOL/L (ref 3.5–5.1)
PROT SERPL-MCNC: 5.5 G/DL (ref 6–8.3)
RBC # BLD AUTO: 3.98 MIL/UL (ref 4.5–6.2)
RBC MORPH BLD: (no result)
SAO2 % BLDA: 94.4 % (ref 95–99)
SAO2 % BLDA: 96.2 % (ref 95–99)
SODIUM SERPL-SCNC: 135 MMOL/L (ref 136–145)
WBC # BLD AUTO: 32.1 K/UL (ref 4.8–10.8)

## 2020-08-03 PROCEDURE — 5A09357 ASSISTANCE WITH RESPIRATORY VENTILATION, LESS THAN 24 CONSECUTIVE HOURS, CONTINUOUS POSITIVE AIRWAY PRESSURE: ICD-10-PCS | Performed by: INTERNAL MEDICINE

## 2020-08-03 RX ADMIN — METOPROLOL TARTRATE SCH MG: 1 INJECTION, SOLUTION INTRAVENOUS at 15:30

## 2020-08-03 RX ADMIN — GUAIFENESIN AND CODEINE PHOSPHATE SCH ML: 100; 10 SOLUTION ORAL at 23:15

## 2020-08-03 RX ADMIN — SODIUM CHLORIDE SCH GM: 9 INJECTION, SOLUTION INTRAVENOUS at 21:00

## 2020-08-03 RX ADMIN — SODIUM CHLORIDE SCH UNIT: 9 INJECTION, SOLUTION INTRAVENOUS at 16:30

## 2020-08-03 RX ADMIN — GUAIFENESIN AND CODEINE PHOSPHATE SCH ML: 100; 10 SOLUTION ORAL at 04:01

## 2020-08-03 RX ADMIN — MORPHINE SULFATE PRN MG: 2 INJECTION, SOLUTION INTRAMUSCULAR; INTRAVENOUS at 16:05

## 2020-08-03 RX ADMIN — SUCRALFATE SCH GM: 1 TABLET ORAL at 10:00

## 2020-08-03 RX ADMIN — BENZONATATE SCH MG: 100 CAPSULE ORAL at 16:15

## 2020-08-03 RX ADMIN — METOPROLOL TARTRATE SCH MG: 1 INJECTION, SOLUTION INTRAVENOUS at 09:30

## 2020-08-03 RX ADMIN — ASPIRIN TAB DELAYED RELEASE 81 MG SCH MG: 81 TABLET DELAYED RESPONSE at 10:00

## 2020-08-03 RX ADMIN — MORPHINE SULFATE PRN MG: 2 INJECTION, SOLUTION INTRAMUSCULAR; INTRAVENOUS at 03:37

## 2020-08-03 RX ADMIN — Medication SCH GM: at 09:59

## 2020-08-03 RX ADMIN — METHYLPREDNISOLONE SODIUM SUCCINATE SCH MG: 40 INJECTION, POWDER, FOR SOLUTION INTRAMUSCULAR; INTRAVENOUS at 21:00

## 2020-08-03 RX ADMIN — ENOXAPARIN SODIUM SCH MG: 100 INJECTION SUBCUTANEOUS at 21:00

## 2020-08-03 RX ADMIN — MORPHINE SULFATE PRN MG: 2 INJECTION, SOLUTION INTRAMUSCULAR; INTRAVENOUS at 04:13

## 2020-08-03 RX ADMIN — METHYLPREDNISOLONE SODIUM SUCCINATE SCH MG: 40 INJECTION, POWDER, FOR SOLUTION INTRAMUSCULAR; INTRAVENOUS at 10:06

## 2020-08-03 RX ADMIN — BENZONATATE SCH MG: 100 CAPSULE ORAL at 10:15

## 2020-08-03 RX ADMIN — Medication SCH MG: at 23:00

## 2020-08-03 RX ADMIN — SUCRALFATE SCH GM: 1 TABLET ORAL at 11:30

## 2020-08-03 RX ADMIN — ENOXAPARIN SODIUM SCH MG: 100 INJECTION SUBCUTANEOUS at 09:58

## 2020-08-03 RX ADMIN — METHYLPREDNISOLONE SODIUM SUCCINATE SCH MG: 40 INJECTION, POWDER, FOR SOLUTION INTRAMUSCULAR; INTRAVENOUS at 16:14

## 2020-08-03 RX ADMIN — GUAIFENESIN AND CODEINE PHOSPHATE SCH ML: 100; 10 SOLUTION ORAL at 11:15

## 2020-08-03 RX ADMIN — BENZONATATE SCH MG: 100 CAPSULE ORAL at 23:00

## 2020-08-03 RX ADMIN — SIMVASTATIN SCH MG: 20 TABLET, FILM COATED ORAL at 23:00

## 2020-08-03 RX ADMIN — OXYCODONE HYDROCHLORIDE AND ACETAMINOPHEN SCH MG: 500 TABLET ORAL at 10:00

## 2020-08-03 RX ADMIN — GUAIFENESIN AND CODEINE PHOSPHATE SCH ML: 100; 10 SOLUTION ORAL at 17:15

## 2020-08-03 RX ADMIN — SUCRALFATE SCH GM: 1 TABLET ORAL at 23:00

## 2020-08-03 RX ADMIN — PANTOPRAZOLE SODIUM SCH MG: 40 TABLET, DELAYED RELEASE ORAL at 10:00

## 2020-08-03 RX ADMIN — MIDODRINE HYDROCHLORIDE SCH MG: 5 TABLET ORAL at 23:45

## 2020-08-03 RX ADMIN — SUCRALFATE SCH GM: 1 TABLET ORAL at 16:30

## 2020-08-03 RX ADMIN — SODIUM CHLORIDE SCH UNIT: 9 INJECTION, SOLUTION INTRAVENOUS at 11:30

## 2020-08-03 RX ADMIN — MIDODRINE HYDROCHLORIDE SCH MG: 5 TABLET ORAL at 16:15

## 2020-08-03 RX ADMIN — SODIUM CHLORIDE SCH UNIT: 9 INJECTION, SOLUTION INTRAVENOUS at 21:00

## 2020-08-03 RX ADMIN — SODIUM CHLORIDE SCH UNIT: 9 INJECTION, SOLUTION INTRAVENOUS at 06:24

## 2020-08-03 RX ADMIN — MORPHINE SULFATE PRN MG: 2 INJECTION, SOLUTION INTRAMUSCULAR; INTRAVENOUS at 13:09

## 2020-08-03 RX ADMIN — MORPHINE SULFATE PRN MG: 2 INJECTION, SOLUTION INTRAMUSCULAR; INTRAVENOUS at 16:07

## 2020-08-03 NOTE — NUR
Patient states he would like to change his code status from "Do not Intubate" to "Full 
code". He also states he would like to have tube to help breath. Nurse notified .

## 2020-08-03 NOTE — NUR
CRNA at bedside for intubation. CRNA discussed with patient the intubation process. Nurse 
witness discussion along with respiratory therapist

## 2020-08-03 NOTE — NUR
hypotension

nurse was notified pt was hypotensive 60-70's spb.  pt was alert no complaints.  dr. ryan was notified and was at bedside quickly.  500cc bolus was ordered over 30min.  
blood pressure after is still low, no other changes with condition.  Dr. ryan texted 
via cell.  awaiting response at this time, will continue to monitor.

## 2020-08-03 NOTE — NUR
Approached patient in room. Patient states he can't breath and complaint of chest pain. 
Hr-142, oxygen saturation 82 on Bipap, blood pressure 69/46 on Levophed.

## 2020-08-03 NOTE — NUR
physician update

dr. ryan paged regarding bladder scan resulted around 75ml.  pt wbc elevated and hr 
slightly tachycardiac. waiting for call back.

## 2020-08-03 NOTE — NUR
renal ultrasound

pt has been npo since last night, will keep npo for ultrasound.  spoke to tech, she will be 
here to do test soon.

## 2020-08-03 NOTE — NUR
physician update 2

spoke with Dr. Dotson, no new orders now, some medication were discontinued this 
morning.  will continue with morphine prn for chest pain.  will continue to monitor pt.

## 2020-08-03 NOTE — NUR
HYPOTENSION

DR. MILLER NOTIFIED PT STILL HYPOTENSIVE AFTER 1ST BOLUS.  SECOND BOLUS WAS STARTED AT 
1600.  ORDER FOR MIDODRINE RECEIVED AND GIVEN TO PT, ALSO NOTIFIED PT HAS HAD LITTLE U/O 
LESS THEN 50CC.  PT CONTINUED TO BE HYPOTENSIVE, BECAME COOL AND CLAMMY, RAPID RESPONSE WAS 
CALLED.  AND ORDERS FOR TRANSFER TO ICU AND LEVOPHED GTT OBTAINED.  AWAITING ON BED IN ICU, 
GTT STARTED PER ICU/TELE CHARGE NURSE, PT ON Q 15MIN AUTO B/P CHECKS AND TITRATING GTT PRN 
FOR MAP >55. PT IS DROWSY, WILL WAKE TO VERBAL STIM.  ABLE TO MOVE EXTREMITIES.  REPORT WAS 
CALLED TO EMELI ARIAS IN ICU.

## 2020-08-03 NOTE — NUR
CHEST PAIN



Pt c/of chest pain pressure  scale 10/10.  Contacted MD Hospitalist and received orders ( 
EKG, CHEST XRAY, TROP and LABS) and Respiratory placed patient on Bipap 100% FiO2. RN gave 
one dose of morphine 2mg to subside pain.  Upon reassessment, patient continuous to have 
chest pressure 8/10 and received a second order of 1 time does morphine 2mg. Patient 
continuous to be monitored. Patient AOX3, follows commands, call light within reach. Will 
continue to monitor.

## 2020-08-04 VITALS — DIASTOLIC BLOOD PRESSURE: 77 MMHG | SYSTOLIC BLOOD PRESSURE: 117 MMHG

## 2020-08-04 VITALS — SYSTOLIC BLOOD PRESSURE: 83 MMHG | DIASTOLIC BLOOD PRESSURE: 59 MMHG

## 2020-08-04 VITALS — DIASTOLIC BLOOD PRESSURE: 69 MMHG | SYSTOLIC BLOOD PRESSURE: 136 MMHG

## 2020-08-04 VITALS — DIASTOLIC BLOOD PRESSURE: 84 MMHG | SYSTOLIC BLOOD PRESSURE: 162 MMHG

## 2020-08-04 VITALS — DIASTOLIC BLOOD PRESSURE: 82 MMHG | SYSTOLIC BLOOD PRESSURE: 132 MMHG

## 2020-08-04 VITALS — DIASTOLIC BLOOD PRESSURE: 69 MMHG | SYSTOLIC BLOOD PRESSURE: 108 MMHG

## 2020-08-04 VITALS — SYSTOLIC BLOOD PRESSURE: 149 MMHG | DIASTOLIC BLOOD PRESSURE: 75 MMHG

## 2020-08-04 VITALS — DIASTOLIC BLOOD PRESSURE: 70 MMHG | SYSTOLIC BLOOD PRESSURE: 130 MMHG

## 2020-08-04 VITALS — SYSTOLIC BLOOD PRESSURE: 130 MMHG | DIASTOLIC BLOOD PRESSURE: 61 MMHG

## 2020-08-04 VITALS — DIASTOLIC BLOOD PRESSURE: 65 MMHG | SYSTOLIC BLOOD PRESSURE: 86 MMHG

## 2020-08-04 VITALS — SYSTOLIC BLOOD PRESSURE: 87 MMHG | DIASTOLIC BLOOD PRESSURE: 49 MMHG

## 2020-08-04 VITALS — SYSTOLIC BLOOD PRESSURE: 133 MMHG | DIASTOLIC BLOOD PRESSURE: 70 MMHG

## 2020-08-04 VITALS — DIASTOLIC BLOOD PRESSURE: 109 MMHG | SYSTOLIC BLOOD PRESSURE: 156 MMHG

## 2020-08-04 VITALS — SYSTOLIC BLOOD PRESSURE: 110 MMHG | DIASTOLIC BLOOD PRESSURE: 88 MMHG

## 2020-08-04 VITALS — SYSTOLIC BLOOD PRESSURE: 123 MMHG | DIASTOLIC BLOOD PRESSURE: 68 MMHG

## 2020-08-04 VITALS — SYSTOLIC BLOOD PRESSURE: 137 MMHG | DIASTOLIC BLOOD PRESSURE: 68 MMHG

## 2020-08-04 VITALS — SYSTOLIC BLOOD PRESSURE: 149 MMHG | DIASTOLIC BLOOD PRESSURE: 106 MMHG

## 2020-08-04 VITALS — SYSTOLIC BLOOD PRESSURE: 150 MMHG | DIASTOLIC BLOOD PRESSURE: 73 MMHG

## 2020-08-04 VITALS — SYSTOLIC BLOOD PRESSURE: 181 MMHG | DIASTOLIC BLOOD PRESSURE: 83 MMHG

## 2020-08-04 VITALS — DIASTOLIC BLOOD PRESSURE: 66 MMHG | SYSTOLIC BLOOD PRESSURE: 128 MMHG

## 2020-08-04 VITALS — SYSTOLIC BLOOD PRESSURE: 152 MMHG | DIASTOLIC BLOOD PRESSURE: 98 MMHG

## 2020-08-04 VITALS — DIASTOLIC BLOOD PRESSURE: 73 MMHG | SYSTOLIC BLOOD PRESSURE: 121 MMHG

## 2020-08-04 VITALS — DIASTOLIC BLOOD PRESSURE: 67 MMHG | SYSTOLIC BLOOD PRESSURE: 146 MMHG

## 2020-08-04 VITALS — SYSTOLIC BLOOD PRESSURE: 156 MMHG | DIASTOLIC BLOOD PRESSURE: 75 MMHG

## 2020-08-04 VITALS — SYSTOLIC BLOOD PRESSURE: 123 MMHG | DIASTOLIC BLOOD PRESSURE: 77 MMHG

## 2020-08-04 VITALS — DIASTOLIC BLOOD PRESSURE: 81 MMHG | SYSTOLIC BLOOD PRESSURE: 152 MMHG

## 2020-08-04 VITALS — DIASTOLIC BLOOD PRESSURE: 69 MMHG | SYSTOLIC BLOOD PRESSURE: 124 MMHG

## 2020-08-04 VITALS — SYSTOLIC BLOOD PRESSURE: 136 MMHG | DIASTOLIC BLOOD PRESSURE: 66 MMHG

## 2020-08-04 VITALS — SYSTOLIC BLOOD PRESSURE: 116 MMHG | DIASTOLIC BLOOD PRESSURE: 70 MMHG

## 2020-08-04 VITALS — DIASTOLIC BLOOD PRESSURE: 83 MMHG | SYSTOLIC BLOOD PRESSURE: 180 MMHG

## 2020-08-04 VITALS — SYSTOLIC BLOOD PRESSURE: 140 MMHG | DIASTOLIC BLOOD PRESSURE: 77 MMHG

## 2020-08-04 VITALS — SYSTOLIC BLOOD PRESSURE: 128 MMHG | DIASTOLIC BLOOD PRESSURE: 69 MMHG

## 2020-08-04 VITALS — DIASTOLIC BLOOD PRESSURE: 59 MMHG | SYSTOLIC BLOOD PRESSURE: 85 MMHG

## 2020-08-04 VITALS — SYSTOLIC BLOOD PRESSURE: 143 MMHG | DIASTOLIC BLOOD PRESSURE: 62 MMHG

## 2020-08-04 VITALS — DIASTOLIC BLOOD PRESSURE: 69 MMHG | SYSTOLIC BLOOD PRESSURE: 157 MMHG

## 2020-08-04 VITALS — DIASTOLIC BLOOD PRESSURE: 82 MMHG | SYSTOLIC BLOOD PRESSURE: 160 MMHG

## 2020-08-04 VITALS — DIASTOLIC BLOOD PRESSURE: 45 MMHG | SYSTOLIC BLOOD PRESSURE: 70 MMHG

## 2020-08-04 VITALS — SYSTOLIC BLOOD PRESSURE: 78 MMHG | DIASTOLIC BLOOD PRESSURE: 54 MMHG

## 2020-08-04 VITALS — DIASTOLIC BLOOD PRESSURE: 68 MMHG | SYSTOLIC BLOOD PRESSURE: 133 MMHG

## 2020-08-04 VITALS — SYSTOLIC BLOOD PRESSURE: 117 MMHG | DIASTOLIC BLOOD PRESSURE: 68 MMHG

## 2020-08-04 VITALS — SYSTOLIC BLOOD PRESSURE: 153 MMHG | DIASTOLIC BLOOD PRESSURE: 68 MMHG

## 2020-08-04 VITALS — DIASTOLIC BLOOD PRESSURE: 78 MMHG | SYSTOLIC BLOOD PRESSURE: 125 MMHG

## 2020-08-04 VITALS — DIASTOLIC BLOOD PRESSURE: 54 MMHG | SYSTOLIC BLOOD PRESSURE: 159 MMHG

## 2020-08-04 VITALS — SYSTOLIC BLOOD PRESSURE: 173 MMHG | DIASTOLIC BLOOD PRESSURE: 87 MMHG

## 2020-08-04 VITALS — SYSTOLIC BLOOD PRESSURE: 131 MMHG | DIASTOLIC BLOOD PRESSURE: 67 MMHG

## 2020-08-04 VITALS — DIASTOLIC BLOOD PRESSURE: 86 MMHG | SYSTOLIC BLOOD PRESSURE: 158 MMHG

## 2020-08-04 VITALS — DIASTOLIC BLOOD PRESSURE: 75 MMHG | SYSTOLIC BLOOD PRESSURE: 133 MMHG

## 2020-08-04 VITALS — SYSTOLIC BLOOD PRESSURE: 133 MMHG | DIASTOLIC BLOOD PRESSURE: 71 MMHG

## 2020-08-04 VITALS — SYSTOLIC BLOOD PRESSURE: 151 MMHG | DIASTOLIC BLOOD PRESSURE: 71 MMHG

## 2020-08-04 VITALS — SYSTOLIC BLOOD PRESSURE: 187 MMHG | DIASTOLIC BLOOD PRESSURE: 107 MMHG

## 2020-08-04 VITALS — SYSTOLIC BLOOD PRESSURE: 119 MMHG | DIASTOLIC BLOOD PRESSURE: 76 MMHG

## 2020-08-04 VITALS — SYSTOLIC BLOOD PRESSURE: 100 MMHG | DIASTOLIC BLOOD PRESSURE: 69 MMHG

## 2020-08-04 VITALS — SYSTOLIC BLOOD PRESSURE: 115 MMHG | DIASTOLIC BLOOD PRESSURE: 63 MMHG

## 2020-08-04 VITALS — DIASTOLIC BLOOD PRESSURE: 69 MMHG | SYSTOLIC BLOOD PRESSURE: 107 MMHG

## 2020-08-04 VITALS — DIASTOLIC BLOOD PRESSURE: 92 MMHG | SYSTOLIC BLOOD PRESSURE: 186 MMHG

## 2020-08-04 VITALS — SYSTOLIC BLOOD PRESSURE: 179 MMHG | DIASTOLIC BLOOD PRESSURE: 113 MMHG

## 2020-08-04 VITALS — SYSTOLIC BLOOD PRESSURE: 127 MMHG | DIASTOLIC BLOOD PRESSURE: 61 MMHG

## 2020-08-04 VITALS — DIASTOLIC BLOOD PRESSURE: 79 MMHG | SYSTOLIC BLOOD PRESSURE: 158 MMHG

## 2020-08-04 VITALS — DIASTOLIC BLOOD PRESSURE: 82 MMHG | SYSTOLIC BLOOD PRESSURE: 149 MMHG

## 2020-08-04 VITALS — SYSTOLIC BLOOD PRESSURE: 134 MMHG | DIASTOLIC BLOOD PRESSURE: 75 MMHG

## 2020-08-04 VITALS — DIASTOLIC BLOOD PRESSURE: 87 MMHG | SYSTOLIC BLOOD PRESSURE: 145 MMHG

## 2020-08-04 LAB
ALBUMIN SERPL-MCNC: 1 G/DL (ref 3.5–5)
AST SERPL-CCNC: 2887 U/L (ref 10–37)
BASE EXCESS BLDA CALC-SCNC: -7.5 MMOL/L (ref -2–3)
BASE EXCESS BLDA CALC-SCNC: -8.1 MMOL/L (ref -2–3)
BASOPHILS NFR BLD AUTO: 0.1 % (ref 0–5)
BASOPHILS NFR BLD AUTO: 0.3 % (ref 0–5)
BILIRUB SERPL-MCNC: 0.8 MG/DL (ref 0.2–1)
BUN SERPL-MCNC: 76 MG/DL (ref 7–18)
CHLORIDE SERPL-SCNC: 102 MMOL/L (ref 101–111)
CO2 SERPL-SCNC: 23 MMOL/L (ref 21–32)
CREAT SERPL-MCNC: 4.4 MG/DL (ref 0.5–1.5)
CRP SERPL HS-MCNC: 41.7 MG/L (ref 0–9)
EOSINOPHIL NFR BLD AUTO: 0.1 % (ref 0–8)
EOSINOPHIL NFR BLD AUTO: 2.2 % (ref 0–8)
ERYTHROCYTE [DISTWIDTH] IN BLOOD BY AUTOMATED COUNT: 13 % (ref 11–15.5)
ERYTHROCYTE [DISTWIDTH] IN BLOOD BY AUTOMATED COUNT: 13.1 % (ref 11–15.5)
GFR SERPL CREATININE-BSD FRML MDRD: 14 ML/MIN (ref 60–?)
GLUCOSE SERPL-MCNC: 345 MG/DL (ref 70–105)
HCO3 BLDA-SCNC: 19.5 MMOL/L (ref 21–28)
HCO3 BLDA-SCNC: 21.1 MMOL/L (ref 21–28)
HCT VFR BLD AUTO: 19.2 % (ref 42–54)
HCT VFR BLD AUTO: 32.4 % (ref 42–54)
LDH SERPL-CCNC: 5354 U/L (ref 81–234)
LYMPHOCYTES NFR SPEC AUTO: 2.3 % (ref 21–51)
LYMPHOCYTES NFR SPEC AUTO: 4.8 % (ref 21–51)
MCH RBC QN AUTO: 31.3 PG (ref 27–33)
MCH RBC QN AUTO: 31.4 PG (ref 27–33)
MCHC RBC AUTO-ENTMCNC: 33.6 G/DL (ref 32–36)
MCHC RBC AUTO-ENTMCNC: 33.9 G/DL (ref 32–36)
MCV RBC AUTO: 92.8 FL (ref 79–99)
MCV RBC AUTO: 93.1 FL (ref 79–99)
MONOCYTES NFR BLD AUTO: 1.4 % (ref 3–13)
MONOCYTES NFR BLD AUTO: 1.6 % (ref 3–13)
NEUTROPHILS NFR BLD AUTO: 91.5 % (ref 40–77)
NEUTROPHILS NFR BLD AUTO: 91.8 % (ref 40–77)
NRBC BLD MANUAL-RTO: 0.6 % (ref 0–0.19)
NRBC BLD MANUAL-RTO: 0.7 % (ref 0–0.19)
PCO2 BLDA: 48 MMHG (ref 35–48)
PCO2 BLDA: 59 MMHG (ref 35–48)
PLATELET # BLD AUTO: 67 K/UL (ref 130–400)
PLATELET # BLD AUTO: 84 K/UL (ref 130–400)
POTASSIUM SERPL-SCNC: 4.4 MMOL/L (ref 3.5–5.1)
PROT SERPL-MCNC: 3.1 G/DL (ref 6–8.3)
RBC # BLD AUTO: 2.07 MIL/UL (ref 4.5–6.2)
RBC # BLD AUTO: 3.48 MIL/UL (ref 4.5–6.2)
SAO2 % BLDA: 56.7 % (ref 95–99)
SAO2 % BLDA: 98.7 % (ref 95–99)
SODIUM SERPL-SCNC: 143 MMOL/L (ref 136–145)
WBC # BLD AUTO: 17.1 K/UL (ref 4.8–10.8)
WBC # BLD AUTO: 18.5 K/UL (ref 4.8–10.8)

## 2020-08-04 PROCEDURE — 5A1935Z RESPIRATORY VENTILATION, LESS THAN 24 CONSECUTIVE HOURS: ICD-10-PCS | Performed by: INTERNAL MEDICINE

## 2020-08-04 PROCEDURE — 0BH17EZ INSERTION OF ENDOTRACHEAL AIRWAY INTO TRACHEA, VIA NATURAL OR ARTIFICIAL OPENING: ICD-10-PCS | Performed by: INTERNAL MEDICINE

## 2020-08-04 PROCEDURE — 5A12012 PERFORMANCE OF CARDIAC OUTPUT, SINGLE, MANUAL: ICD-10-PCS | Performed by: INTERNAL MEDICINE

## 2020-08-04 RX ADMIN — SODIUM CHLORIDE SCH GM: 9 INJECTION, SOLUTION INTRAVENOUS at 09:55

## 2020-08-04 RX ADMIN — METHYLPREDNISOLONE SODIUM SUCCINATE SCH MG: 40 INJECTION, POWDER, FOR SOLUTION INTRAMUSCULAR; INTRAVENOUS at 14:00

## 2020-08-04 RX ADMIN — METOPROLOL TARTRATE SCH MG: 1 INJECTION, SOLUTION INTRAVENOUS at 14:13

## 2020-08-04 RX ADMIN — GUAIFENESIN AND CODEINE PHOSPHATE SCH ML: 100; 10 SOLUTION ORAL at 16:19

## 2020-08-04 RX ADMIN — SUCRALFATE SCH GM: 1 TABLET ORAL at 07:30

## 2020-08-04 RX ADMIN — ASPIRIN TAB DELAYED RELEASE 81 MG SCH MG: 81 TABLET DELAYED RESPONSE at 09:55

## 2020-08-04 RX ADMIN — BENZONATATE SCH MG: 100 CAPSULE ORAL at 13:39

## 2020-08-04 RX ADMIN — SODIUM CHLORIDE SCH UNIT: 9 INJECTION, SOLUTION INTRAVENOUS at 11:30

## 2020-08-04 RX ADMIN — SUCRALFATE SCH GM: 1 TABLET ORAL at 11:30

## 2020-08-04 RX ADMIN — SODIUM POLYSTYRENE SULFONATE SCH GM: 15 SUSPENSION ORAL; RECTAL at 16:00

## 2020-08-04 RX ADMIN — SODIUM POLYSTYRENE SULFONATE SCH GM: 15 SUSPENSION ORAL; RECTAL at 04:00

## 2020-08-04 RX ADMIN — METOPROLOL TARTRATE SCH MG: 1 INJECTION, SOLUTION INTRAVENOUS at 03:30

## 2020-08-04 RX ADMIN — SODIUM CHLORIDE SCH MG: 9 INJECTION, SOLUTION INTRAVENOUS at 04:00

## 2020-08-04 RX ADMIN — SODIUM POLYSTYRENE SULFONATE SCH GM: 15 SUSPENSION ORAL; RECTAL at 12:00

## 2020-08-04 RX ADMIN — SODIUM CHLORIDE SCH MG: 9 INJECTION, SOLUTION INTRAVENOUS at 00:22

## 2020-08-04 RX ADMIN — SODIUM CHLORIDE SCH UNIT: 9 INJECTION, SOLUTION INTRAVENOUS at 07:30

## 2020-08-04 RX ADMIN — MIDODRINE HYDROCHLORIDE SCH MG: 5 TABLET ORAL at 09:56

## 2020-08-04 RX ADMIN — OXYCODONE HYDROCHLORIDE AND ACETAMINOPHEN SCH MG: 500 TABLET ORAL at 09:55

## 2020-08-04 RX ADMIN — SUCRALFATE SCH GM: 1 TABLET ORAL at 16:19

## 2020-08-04 RX ADMIN — SODIUM POLYSTYRENE SULFONATE SCH GM: 15 SUSPENSION ORAL; RECTAL at 08:00

## 2020-08-04 RX ADMIN — SODIUM CHLORIDE SCH MG: 9 INJECTION, SOLUTION INTRAVENOUS at 09:55

## 2020-08-04 RX ADMIN — METHYLPREDNISOLONE SODIUM SUCCINATE SCH MG: 40 INJECTION, POWDER, FOR SOLUTION INTRAMUSCULAR; INTRAVENOUS at 09:55

## 2020-08-04 RX ADMIN — MIDODRINE HYDROCHLORIDE SCH MG: 5 TABLET ORAL at 15:45

## 2020-08-04 RX ADMIN — GUAIFENESIN AND CODEINE PHOSPHATE SCH ML: 100; 10 SOLUTION ORAL at 05:15

## 2020-08-04 RX ADMIN — SODIUM POLYSTYRENE SULFONATE SCH GM: 15 SUSPENSION ORAL; RECTAL at 00:22

## 2020-08-04 RX ADMIN — Medication SCH GM: at 09:00

## 2020-08-04 RX ADMIN — PANTOPRAZOLE SODIUM SCH MG: 40 TABLET, DELAYED RELEASE ORAL at 09:55

## 2020-08-04 RX ADMIN — GUAIFENESIN AND CODEINE PHOSPHATE SCH ML: 100; 10 SOLUTION ORAL at 09:27

## 2020-08-04 RX ADMIN — METOPROLOL TARTRATE SCH MG: 1 INJECTION, SOLUTION INTRAVENOUS at 09:23

## 2020-08-04 RX ADMIN — SODIUM CHLORIDE SCH UNIT: 9 INJECTION, SOLUTION INTRAVENOUS at 16:19

## 2020-08-04 RX ADMIN — BENZONATATE SCH MG: 100 CAPSULE ORAL at 09:00

## 2020-08-04 RX ADMIN — ENOXAPARIN SODIUM SCH MG: 100 INJECTION SUBCUTANEOUS at 09:00

## 2020-08-04 NOTE — NUR
blood pressure

this nurse notified  and dr. العلي throughout shift about unreadable blood 
pressures. no new orders obtained. cont w/current vasopressors and continue w/current 
treatment.will cont to monitor.

## 2020-08-04 NOTE — NUR
Stat CBC called due to suspected bleeding, 2100 dose of Lovenox held. CBC revealed Hgb 6.5, 
Hct 19.2.. Dr. PATRICIA Burch contacted of results, he states he will put order in to transfuse 2 
units.

## 2020-08-04 NOTE — NUR
at 1825 pt noted not to have any palpable pulses. er doc notified and at bedside at 1830 to 
pronounce. daughter heidy notified at 1845.

## 2020-08-04 NOTE — NUR
Blood transfusion







Pt received 1st unit of blood, no adverse reaction noted, seconde unit started at 0605.

## 2020-08-04 NOTE — NUR
family  update

this nurse zoomed pt w/family members.this nurse updated family on pts condition. explained 
to daughter critical condition pt is in. this nurse also educated family we will continue 
treating patient aggressively per family request.